# Patient Record
Sex: FEMALE | Race: BLACK OR AFRICAN AMERICAN | Employment: FULL TIME | URBAN - METROPOLITAN AREA
[De-identification: names, ages, dates, MRNs, and addresses within clinical notes are randomized per-mention and may not be internally consistent; named-entity substitution may affect disease eponyms.]

---

## 2017-12-03 ENCOUNTER — APPOINTMENT (OUTPATIENT)
Dept: CT IMAGING | Age: 22
End: 2017-12-03
Attending: EMERGENCY MEDICINE
Payer: COMMERCIAL

## 2017-12-03 ENCOUNTER — HOSPITAL ENCOUNTER (EMERGENCY)
Age: 22
Discharge: HOME | End: 2017-12-03
Attending: EMERGENCY MEDICINE | Admitting: EMERGENCY MEDICINE
Payer: COMMERCIAL

## 2017-12-03 VITALS
OXYGEN SATURATION: 100 % | DIASTOLIC BLOOD PRESSURE: 62 MMHG | WEIGHT: 232 LBS | HEIGHT: 66 IN | HEART RATE: 79 BPM | TEMPERATURE: 98.4 F | SYSTOLIC BLOOD PRESSURE: 106 MMHG | RESPIRATION RATE: 16 BRPM | BODY MASS INDEX: 37.28 KG/M2

## 2017-12-03 DIAGNOSIS — K76.9 LIVER LESION: ICD-10-CM

## 2017-12-03 DIAGNOSIS — R10.84 ABDOMINAL PAIN, GENERALIZED: Primary | ICD-10-CM

## 2017-12-03 DIAGNOSIS — R19.7 DIARRHEA, UNSPECIFIED TYPE: ICD-10-CM

## 2017-12-03 LAB
ANION GAP SERPL CALC-SCNC: 8 MMOL/L (ref 5–15)
APPEARANCE UR: CLEAR
BACTERIA URNS QL MICRO: ABNORMAL /HPF
BASOPHILS # BLD: 0.1 K/UL (ref 0–0.1)
BASOPHILS NFR BLD: 1 % (ref 0–1)
BILIRUB UR QL CFM: NEGATIVE
BUN SERPL-MCNC: 7 MG/DL (ref 6–20)
BUN/CREAT SERPL: 11 (ref 12–20)
CALCIUM SERPL-MCNC: 8.7 MG/DL (ref 8.5–10.1)
CHLORIDE SERPL-SCNC: 108 MMOL/L (ref 97–108)
CO2 SERPL-SCNC: 24 MMOL/L (ref 21–32)
COLOR UR: ABNORMAL
CREAT SERPL-MCNC: 0.61 MG/DL (ref 0.55–1.02)
EOSINOPHIL # BLD: 0.2 K/UL (ref 0–0.4)
EOSINOPHIL NFR BLD: 2 % (ref 0–7)
EPITH CASTS URNS QL MICRO: ABNORMAL /LPF
ERYTHROCYTE [DISTWIDTH] IN BLOOD BY AUTOMATED COUNT: 16 % (ref 11.5–14.5)
GLUCOSE SERPL-MCNC: 79 MG/DL (ref 65–100)
GLUCOSE UR STRIP.AUTO-MCNC: NEGATIVE MG/DL
HCG UR QL: NEGATIVE
HCT VFR BLD AUTO: 34.5 % (ref 35–47)
HGB BLD-MCNC: 10.7 G/DL (ref 11.5–16)
HGB UR QL STRIP: NEGATIVE
KETONES UR QL STRIP.AUTO: ABNORMAL MG/DL
LEUKOCYTE ESTERASE UR QL STRIP.AUTO: NEGATIVE
LIPASE SERPL-CCNC: 135 U/L (ref 73–393)
LYMPHOCYTES # BLD: 3 K/UL (ref 0.8–3.5)
LYMPHOCYTES NFR BLD: 44 % (ref 12–49)
MCH RBC QN AUTO: 23.9 PG (ref 26–34)
MCHC RBC AUTO-ENTMCNC: 31 G/DL (ref 30–36.5)
MCV RBC AUTO: 77 FL (ref 80–99)
MONOCYTES # BLD: 0.4 K/UL (ref 0–1)
MONOCYTES NFR BLD: 6 % (ref 5–13)
MUCOUS THREADS URNS QL MICRO: ABNORMAL /LPF
NEUTS SEG # BLD: 3.2 K/UL (ref 1.8–8)
NEUTS SEG NFR BLD: 47 % (ref 32–75)
NITRITE UR QL STRIP.AUTO: NEGATIVE
PH UR STRIP: 6 [PH] (ref 5–8)
PLATELET # BLD AUTO: 386 K/UL (ref 150–400)
POTASSIUM SERPL-SCNC: 3.5 MMOL/L (ref 3.5–5.1)
PROT UR STRIP-MCNC: NEGATIVE MG/DL
RBC # BLD AUTO: 4.48 M/UL (ref 3.8–5.2)
RBC #/AREA URNS HPF: ABNORMAL /HPF (ref 0–5)
SODIUM SERPL-SCNC: 140 MMOL/L (ref 136–145)
SP GR UR REFRACTOMETRY: 1.03 (ref 1–1.03)
UR CULT HOLD, URHOLD: NORMAL
UROBILINOGEN UR QL STRIP.AUTO: 1 EU/DL (ref 0.2–1)
WBC # BLD AUTO: 6.8 K/UL (ref 3.6–11)
WBC URNS QL MICRO: ABNORMAL /HPF (ref 0–4)

## 2017-12-03 PROCEDURE — 96361 HYDRATE IV INFUSION ADD-ON: CPT

## 2017-12-03 PROCEDURE — 74177 CT ABD & PELVIS W/CONTRAST: CPT

## 2017-12-03 PROCEDURE — 81001 URINALYSIS AUTO W/SCOPE: CPT | Performed by: EMERGENCY MEDICINE

## 2017-12-03 PROCEDURE — 80048 BASIC METABOLIC PNL TOTAL CA: CPT | Performed by: STUDENT IN AN ORGANIZED HEALTH CARE EDUCATION/TRAINING PROGRAM

## 2017-12-03 PROCEDURE — 74011636320 HC RX REV CODE- 636/320: Performed by: EMERGENCY MEDICINE

## 2017-12-03 PROCEDURE — 74011000258 HC RX REV CODE- 258: Performed by: EMERGENCY MEDICINE

## 2017-12-03 PROCEDURE — 74011250636 HC RX REV CODE- 250/636: Performed by: EMERGENCY MEDICINE

## 2017-12-03 PROCEDURE — 36415 COLL VENOUS BLD VENIPUNCTURE: CPT | Performed by: STUDENT IN AN ORGANIZED HEALTH CARE EDUCATION/TRAINING PROGRAM

## 2017-12-03 PROCEDURE — 81025 URINE PREGNANCY TEST: CPT

## 2017-12-03 PROCEDURE — 96374 THER/PROPH/DIAG INJ IV PUSH: CPT

## 2017-12-03 PROCEDURE — 96375 TX/PRO/DX INJ NEW DRUG ADDON: CPT

## 2017-12-03 PROCEDURE — 85025 COMPLETE CBC W/AUTO DIFF WBC: CPT | Performed by: STUDENT IN AN ORGANIZED HEALTH CARE EDUCATION/TRAINING PROGRAM

## 2017-12-03 PROCEDURE — 83690 ASSAY OF LIPASE: CPT | Performed by: EMERGENCY MEDICINE

## 2017-12-03 PROCEDURE — 99284 EMERGENCY DEPT VISIT MOD MDM: CPT

## 2017-12-03 RX ORDER — LOPERAMIDE HCL 2 MG
2 TABLET ORAL
Qty: 20 TAB | Refills: 0 | Status: SHIPPED | OUTPATIENT
Start: 2017-12-03 | End: 2018-10-27

## 2017-12-03 RX ORDER — SODIUM CHLORIDE 0.9 % (FLUSH) 0.9 %
10 SYRINGE (ML) INJECTION
Status: COMPLETED | OUTPATIENT
Start: 2017-12-03 | End: 2017-12-03

## 2017-12-03 RX ORDER — ONDANSETRON 2 MG/ML
4 INJECTION INTRAMUSCULAR; INTRAVENOUS
Status: COMPLETED | OUTPATIENT
Start: 2017-12-03 | End: 2017-12-03

## 2017-12-03 RX ORDER — ONDANSETRON 4 MG/1
4 TABLET, ORALLY DISINTEGRATING ORAL
Qty: 15 TAB | Refills: 0 | Status: SHIPPED | OUTPATIENT
Start: 2017-12-03 | End: 2021-09-02 | Stop reason: ALTCHOICE

## 2017-12-03 RX ORDER — KETOROLAC TROMETHAMINE 30 MG/ML
30 INJECTION, SOLUTION INTRAMUSCULAR; INTRAVENOUS
Status: COMPLETED | OUTPATIENT
Start: 2017-12-03 | End: 2017-12-03

## 2017-12-03 RX ADMIN — ONDANSETRON 4 MG: 2 INJECTION INTRAMUSCULAR; INTRAVENOUS at 19:20

## 2017-12-03 RX ADMIN — SODIUM CHLORIDE 100 ML: 900 INJECTION, SOLUTION INTRAVENOUS at 19:46

## 2017-12-03 RX ADMIN — IOPAMIDOL 100 ML: 755 INJECTION, SOLUTION INTRAVENOUS at 19:46

## 2017-12-03 RX ADMIN — Medication 10 ML: at 19:46

## 2017-12-03 RX ADMIN — SODIUM CHLORIDE 1000 ML: 900 INJECTION, SOLUTION INTRAVENOUS at 19:20

## 2017-12-03 RX ADMIN — KETOROLAC TROMETHAMINE 30 MG: 30 INJECTION, SOLUTION INTRAMUSCULAR at 19:21

## 2017-12-03 NOTE — ED TRIAGE NOTES
Pt states that she has been having abd pain since Wednesday after she eats anything to her lower abd and Thursday started with diarrhea.

## 2017-12-03 NOTE — ED PROVIDER NOTES
HPI Comments: 25 y.o. female with no significant past medical history who presents ambulatory from home accompanied by her  with chief complaint of diarrhea. Pt reports 4-day history of mid-abdominal pain. Pt states diarrhea onset the following day with nausea. Pt reports 5 episodes of diarrhea per day. Pt elicits prior history of gastroenteritis, but notes today's symptoms are not c/w that. Pt suspects she is dehydrated. Pt denies previous abdominal surgeries or recent travel. Pt is compliant with prescribed PO birth control. Pertinent negatives include vomiting, dysuria and difficulty urinating. There are no other acute medical concerns at this time. Social hx: denies tobacco use; social EtOH use; denies illicit drug use  PCP: None    Note written by Andreina Santos, as dictated by Ankush Patel MD 7:01 PM        The history is provided by the patient. No  was used. History reviewed. No pertinent past medical history. History reviewed. No pertinent surgical history. History reviewed. No pertinent family history. Social History     Social History    Marital status: SINGLE     Spouse name: N/A    Number of children: N/A    Years of education: N/A     Occupational History    Not on file. Social History Main Topics    Smoking status: Never Smoker    Smokeless tobacco: Never Used    Alcohol use Yes    Drug use: No    Sexual activity: Not on file     Other Topics Concern    Not on file     Social History Narrative    No narrative on file         ALLERGIES: Review of patient's allergies indicates no known allergies. Review of Systems   Constitutional: Negative for fever. HENT: Negative for facial swelling and nosebleeds. Eyes: Negative for pain. Respiratory: Negative for cough, chest tightness and shortness of breath. Cardiovascular: Negative for chest pain and leg swelling.    Gastrointestinal: Positive for abdominal pain, diarrhea and nausea. Negative for vomiting. Endocrine: Negative for polyuria. Genitourinary: Negative for difficulty urinating and flank pain. Musculoskeletal: Negative for arthralgias and back pain. Skin: Negative for color change. Allergic/Immunologic: Negative for immunocompromised state. Neurological: Negative for dizziness and headaches. Hematological: Does not bruise/bleed easily. Psychiatric/Behavioral: Negative for agitation. All other systems reviewed and are negative. Vitals:    12/03/17 1823   BP: (!) 149/99   Pulse: 86   Resp: 16   Temp: 98.2 °F (36.8 °C)   SpO2: 100%   Weight: 105.2 kg (232 lb)   Height: 5' 6\" (1.676 m)            Physical Exam   Constitutional: She is oriented to person, place, and time. She appears well-developed and well-nourished. HENT:   Head: Normocephalic and atraumatic. Right Ear: External ear normal.   Left Ear: External ear normal.   Nose: Nose normal.   Mouth/Throat: Oropharynx is clear and moist.   Eyes: EOM are normal. Pupils are equal, round, and reactive to light. No scleral icterus. Neck: Normal range of motion. Neck supple. No JVD present. No tracheal deviation present. No thyromegaly present. Cardiovascular: Normal rate, regular rhythm, normal heart sounds and intact distal pulses. Exam reveals no friction rub. No murmur heard. Pulmonary/Chest: Effort normal and breath sounds normal. No stridor. No respiratory distress. She has no wheezes. She has no rales. She exhibits no tenderness. Abdominal: Soft. Bowel sounds are normal. She exhibits no distension. There is tenderness in the periumbilical area. There is no rebound and no guarding. Musculoskeletal: Normal range of motion. She exhibits no edema or tenderness. Lymphadenopathy:     She has no cervical adenopathy. Neurological: She is alert and oriented to person, place, and time. She has normal reflexes. No cranial nerve deficit. Coordination normal.   Skin: Skin is warm and dry.  No rash noted. No erythema. Psychiatric: She has a normal mood and affect. Her behavior is normal. Judgment and thought content normal.   Nursing note and vitals reviewed. Note written by Andreina Jonas, as dictated by Mandi Jean MD 7:01 PM     MDM  Number of Diagnoses or Management Options  Diagnosis management comments: 81-year-old  female presents to the emergency department with periumbilical abd tenderness and diarrhea. Will check basic blood work. We'll give IV fluids and Toradol. Differential diagnosis includes gastroenteritis, pancreatitis, hepatitis, colitis, appendicitis. We'll reassess when testing is completed. Patient agrees with this plan. Amount and/or Complexity of Data Reviewed  Clinical lab tests: ordered and reviewed  Tests in the radiology section of CPT®: ordered and reviewed  Tests in the medicine section of CPT®: ordered and reviewed  Decide to obtain previous medical records or to obtain history from someone other than the patient: yes  Obtain history from someone other than the patient: yes  Review and summarize past medical records: yes  Independent visualization of images, tracings, or specimens: yes    Risk of Complications, Morbidity, and/or Mortality  Presenting problems: high  Diagnostic procedures: high  Management options: high      ED Course       Procedures    7:30 PM  UA not resulted yet. Nursing aware and actively working to determine cause. 8:33 PM  Labs WNL. CT abdomen significant for vascular lesion in liver for which radiology recommends outpatient f/u. No acute abnormality. 8:33 PM  Discussed available lab and imaging results with patient. She verbalizes understanding and agrees with care plan. Will discharge patient home with specific return precautions; prescription Imodium and Zofran; f/u with PCP. Good return precautions given to patient. Close follow up with PCP recommended.  Patient and/or family voices understanding of this plan. Discharge instructions were explained by me and all concerns were addressed.

## 2017-12-04 NOTE — ED NOTES
Patient returned from CT, denies any pain, NADN, patient states she is feeling better, family at bedside, patient and family denies any needs at this time

## 2017-12-04 NOTE — DISCHARGE INSTRUCTIONS
Abdominal Pain: Care Instructions  Your Care Instructions    Abdominal pain has many possible causes. Some aren't serious and get better on their own in a few days. Others need more testing and treatment. If your pain continues or gets worse, you need to be rechecked and may need more tests to find out what is wrong. You may need surgery to correct the problem. Don't ignore new symptoms, such as fever, nausea and vomiting, urination problems, pain that gets worse, and dizziness. These may be signs of a more serious problem. Your doctor may have recommended a follow-up visit in the next 8 to 12 hours. If you are not getting better, you may need more tests or treatment. The doctor has checked you carefully, but problems can develop later. If you notice any problems or new symptoms, get medical treatment right away. Follow-up care is a key part of your treatment and safety. Be sure to make and go to all appointments, and call your doctor if you are having problems. It's also a good idea to know your test results and keep a list of the medicines you take. How can you care for yourself at home? · Rest until you feel better. · To prevent dehydration, drink plenty of fluids, enough so that your urine is light yellow or clear like water. Choose water and other caffeine-free clear liquids until you feel better. If you have kidney, heart, or liver disease and have to limit fluids, talk with your doctor before you increase the amount of fluids you drink. · If your stomach is upset, eat mild foods, such as rice, dry toast or crackers, bananas, and applesauce. Try eating several small meals instead of two or three large ones. · Wait until 48 hours after all symptoms have gone away before you have spicy foods, alcohol, and drinks that contain caffeine. · Do not eat foods that are high in fat. · Avoid anti-inflammatory medicines such as aspirin, ibuprofen (Advil, Motrin), and naproxen (Aleve).  These can cause stomach upset. Talk to your doctor if you take daily aspirin for another health problem. When should you call for help? Call 911 anytime you think you may need emergency care. For example, call if:  ? · You passed out (lost consciousness). ? · You pass maroon or very bloody stools. ? · You vomit blood or what looks like coffee grounds. ? · You have new, severe belly pain. ?Call your doctor now or seek immediate medical care if:  ? · Your pain gets worse, especially if it becomes focused in one area of your belly. ? · You have a new or higher fever. ? · Your stools are black and look like tar, or they have streaks of blood. ? · You have unexpected vaginal bleeding. ? · You have symptoms of a urinary tract infection. These may include:  ¨ Pain when you urinate. ¨ Urinating more often than usual.  ¨ Blood in your urine. ? · You are dizzy or lightheaded, or you feel like you may faint. ? Watch closely for changes in your health, and be sure to contact your doctor if:  ? · You are not getting better after 1 day (24 hours). Where can you learn more? Go to http://zeke-ness.info/. Enter Q373 in the search box to learn more about \"Abdominal Pain: Care Instructions. \"  Current as of: March 20, 2017  Content Version: 11.4  © 8709-5214 [employer]. Care instructions adapted under license by [employer] (which disclaims liability or warranty for this information). If you have questions about a medical condition or this instruction, always ask your healthcare professional. [place] [address] any warranty or liability for your use of this information. Diarrhea: Care Instructions  Your Care Instructions    Diarrhea is loose, watery stools (bowel movements). The exact cause is often hard to find. Sometimes diarrhea is your body's way of getting rid of what caused an upset stomach.  Viruses, food poisoning, and many medicines can cause diarrhea. Some people get diarrhea in response to emotional stress, anxiety, or certain foods. Almost everyone has diarrhea now and then. It usually isn't serious, and your stools will return to normal soon. The important thing to do is replace the fluids you have lost, so you can prevent dehydration. The doctor has checked you carefully, but problems can develop later. If you notice any problems or new symptoms, get medical treatment right away. Follow-up care is a key part of your treatment and safety. Be sure to make and go to all appointments, and call your doctor if you are having problems. It's also a good idea to know your test results and keep a list of the medicines you take. How can you care for yourself at home? · Watch for signs of dehydration, which means your body has lost too much water. Dehydration is a serious condition and should be treated right away. Signs of dehydration are:  ¨ Increasing thirst and dry eyes and mouth. ¨ Feeling faint or lightheaded. ¨ Darker urine, and a smaller amount of urine than normal.  · To prevent dehydration, drink plenty of fluids, enough so that your urine is light yellow or clear like water. Choose water and other caffeine-free clear liquids until you feel better. If you have kidney, heart, or liver disease and have to limit fluids, talk with your doctor before you increase the amount of fluids you drink. · Begin eating small amounts of mild foods the next day, if you feel like it. ¨ Try yogurt that has live cultures of Lactobacillus. (Check the label.)  ¨ Avoid spicy foods, fruits, alcohol, and caffeine until 48 hours after all symptoms are gone. ¨ Avoid chewing gum that contains sorbitol. ¨ Avoid dairy products (except for yogurt with Lactobacillus) while you have diarrhea and for 3 days after symptoms are gone. · The doctor may recommend that you take over-the-counter medicine, such as loperamide (Imodium), if you still have diarrhea after 6 hours. Read and follow all instructions on the label. Do not use this medicine if you have bloody diarrhea, a high fever, or other signs of serious illness. Call your doctor if you think you are having a problem with your medicine. When should you call for help? Call 911 anytime you think you may need emergency care. For example, call if:  ? · You passed out (lost consciousness). ? · Your stools are maroon or very bloody. ?Call your doctor now or seek immediate medical care if:  ? · You are dizzy or lightheaded, or you feel like you may faint. ? · Your stools are black and look like tar, or they have streaks of blood. ? · You have new or worse belly pain. ? · You have symptoms of dehydration, such as:  ¨ Dry eyes and a dry mouth. ¨ Passing only a little dark urine. ¨ Feeling thirstier than usual.   ? · You have a new or higher fever. ? Watch closely for changes in your health, and be sure to contact your doctor if:  ? · Your diarrhea is getting worse. ? · You see pus in the diarrhea. ? · You are not getting better after 2 days (48 hours). Where can you learn more? Go to http://zeke-ness.info/. Enter R845 in the search box to learn more about \"Diarrhea: Care Instructions. \"  Current as of: March 20, 2017  Content Version: 11.4  © 7108-1079 [employer]. Care instructions adapted under license by [employer] (which disclaims liability or warranty for this information). If you have questions about a medical condition or this instruction, always ask your healthcare professional. [place] [address] any warranty or liability for your use of this information.

## 2017-12-04 NOTE — ED NOTES
Verbal shift change report given to Laith Sutton RN  (oncoming nurse) by Taylor Joseph RN (offgoing nurse). Report included the following information SBAR, ED Summary, MAR and Recent Results.

## 2018-03-28 LAB
ANTIBODY SCREEN, EXTERNAL: NEGATIVE
CHLAMYDIA, EXTERNAL: NEGATIVE
HBSAG, EXTERNAL: NEGATIVE
HCT, EXTERNAL: 35.8
HGB, EXTERNAL: 11.4
HIV, EXTERNAL: NEGATIVE
N. GONORRHEA, EXTERNAL: NEGATIVE
RPR, EXTERNAL: NON REACTIVE
RUBELLA, EXTERNAL: NORMAL
TYPE, ABO & RH, EXTERNAL: NORMAL

## 2018-10-10 LAB — GRBS, EXTERNAL: NORMAL

## 2018-10-24 ENCOUNTER — HOSPITAL ENCOUNTER (INPATIENT)
Age: 23
LOS: 3 days | Discharge: HOME | End: 2018-10-27
Attending: STUDENT IN AN ORGANIZED HEALTH CARE EDUCATION/TRAINING PROGRAM | Admitting: OBSTETRICS & GYNECOLOGY
Payer: COMMERCIAL

## 2018-10-24 DIAGNOSIS — O13.9 GESTATIONAL HYPERTENSION, ANTEPARTUM: Primary | ICD-10-CM

## 2018-10-24 LAB
ALBUMIN SERPL-MCNC: 2.7 G/DL (ref 3.5–5)
ALBUMIN/GLOB SERPL: 0.6 {RATIO} (ref 1.1–2.2)
ALP SERPL-CCNC: 199 U/L (ref 45–117)
ALT SERPL-CCNC: 14 U/L (ref 12–78)
ANION GAP SERPL CALC-SCNC: 12 MMOL/L (ref 5–15)
AST SERPL-CCNC: 14 U/L (ref 15–37)
BASOPHILS # BLD: 0.1 K/UL (ref 0–0.1)
BASOPHILS NFR BLD: 1 % (ref 0–1)
BILIRUB SERPL-MCNC: 0.2 MG/DL (ref 0.2–1)
BUN SERPL-MCNC: 6 MG/DL (ref 6–20)
BUN/CREAT SERPL: 9 (ref 12–20)
CALCIUM SERPL-MCNC: 8.8 MG/DL (ref 8.5–10.1)
CHLORIDE SERPL-SCNC: 105 MMOL/L (ref 97–108)
CO2 SERPL-SCNC: 22 MMOL/L (ref 21–32)
CREAT SERPL-MCNC: 0.66 MG/DL (ref 0.55–1.02)
DIFFERENTIAL METHOD BLD: ABNORMAL
EOSINOPHIL # BLD: 0.1 K/UL (ref 0–0.4)
EOSINOPHIL NFR BLD: 1 % (ref 0–7)
ERYTHROCYTE [DISTWIDTH] IN BLOOD BY AUTOMATED COUNT: 17.9 % (ref 11.5–14.5)
GLOBULIN SER CALC-MCNC: 4.2 G/DL (ref 2–4)
GLUCOSE SERPL-MCNC: 81 MG/DL (ref 65–100)
HCT VFR BLD AUTO: 31 % (ref 35–47)
HGB BLD-MCNC: 9.7 G/DL (ref 11.5–16)
IMM GRANULOCYTES # BLD: 0.1 K/UL (ref 0–0.04)
IMM GRANULOCYTES NFR BLD AUTO: 1 % (ref 0–0.5)
LYMPHOCYTES # BLD: 2.3 K/UL (ref 0.8–3.5)
LYMPHOCYTES NFR BLD: 17 % (ref 12–49)
MCH RBC QN AUTO: 24.3 PG (ref 26–34)
MCHC RBC AUTO-ENTMCNC: 31.3 G/DL (ref 30–36.5)
MCV RBC AUTO: 77.5 FL (ref 80–99)
MONOCYTES # BLD: 1.1 K/UL (ref 0–1)
MONOCYTES NFR BLD: 8 % (ref 5–13)
NEUTS SEG # BLD: 9.5 K/UL (ref 1.8–8)
NEUTS SEG NFR BLD: 73 % (ref 32–75)
NRBC # BLD: 0 K/UL (ref 0–0.01)
NRBC BLD-RTO: 0 PER 100 WBC
PLATELET # BLD AUTO: 373 K/UL (ref 150–400)
PMV BLD AUTO: 10.4 FL (ref 8.9–12.9)
POTASSIUM SERPL-SCNC: 4.3 MMOL/L (ref 3.5–5.1)
PROT SERPL-MCNC: 6.9 G/DL (ref 6.4–8.2)
RBC # BLD AUTO: 4 M/UL (ref 3.8–5.2)
SODIUM SERPL-SCNC: 139 MMOL/L (ref 136–145)
WBC # BLD AUTO: 13.1 K/UL (ref 3.6–11)

## 2018-10-24 PROCEDURE — 75410000003 HC RECOV DEL/VAG/CSECN EA 0.5 HR: Performed by: STUDENT IN AN ORGANIZED HEALTH CARE EDUCATION/TRAINING PROGRAM

## 2018-10-24 PROCEDURE — 59200 INSERT CERVICAL DILATOR: CPT | Performed by: STUDENT IN AN ORGANIZED HEALTH CARE EDUCATION/TRAINING PROGRAM

## 2018-10-24 PROCEDURE — 74011250636 HC RX REV CODE- 250/636: Performed by: STUDENT IN AN ORGANIZED HEALTH CARE EDUCATION/TRAINING PROGRAM

## 2018-10-24 PROCEDURE — 65270000029 HC RM PRIVATE

## 2018-10-24 PROCEDURE — 80053 COMPREHEN METABOLIC PANEL: CPT | Performed by: STUDENT IN AN ORGANIZED HEALTH CARE EDUCATION/TRAINING PROGRAM

## 2018-10-24 PROCEDURE — 76060000078 HC EPIDURAL ANESTHESIA: Performed by: NURSE ANESTHETIST, CERTIFIED REGISTERED

## 2018-10-24 PROCEDURE — 77010026065 HC OXYGEN MINIMUM MEDICAL AIR: Performed by: STUDENT IN AN ORGANIZED HEALTH CARE EDUCATION/TRAINING PROGRAM

## 2018-10-24 PROCEDURE — 75410000002 HC LABOR FEE PER 1 HR: Performed by: STUDENT IN AN ORGANIZED HEALTH CARE EDUCATION/TRAINING PROGRAM

## 2018-10-24 PROCEDURE — 85025 COMPLETE CBC W/AUTO DIFF WBC: CPT | Performed by: STUDENT IN AN ORGANIZED HEALTH CARE EDUCATION/TRAINING PROGRAM

## 2018-10-24 PROCEDURE — 74011250637 HC RX REV CODE- 250/637: Performed by: OBSTETRICS & GYNECOLOGY

## 2018-10-24 PROCEDURE — 36415 COLL VENOUS BLD VENIPUNCTURE: CPT | Performed by: STUDENT IN AN ORGANIZED HEALTH CARE EDUCATION/TRAINING PROGRAM

## 2018-10-24 RX ORDER — LIDOCAINE HYDROCHLORIDE 10 MG/ML
10 INJECTION INFILTRATION; PERINEURAL ONCE
Status: ACTIVE | OUTPATIENT
Start: 2018-10-24 | End: 2018-10-25

## 2018-10-24 RX ORDER — SODIUM CHLORIDE, SODIUM LACTATE, POTASSIUM CHLORIDE, CALCIUM CHLORIDE 600; 310; 30; 20 MG/100ML; MG/100ML; MG/100ML; MG/100ML
125 INJECTION, SOLUTION INTRAVENOUS CONTINUOUS
Status: DISCONTINUED | OUTPATIENT
Start: 2018-10-24 | End: 2018-10-27

## 2018-10-24 RX ORDER — ONDANSETRON 2 MG/ML
4 INJECTION INTRAMUSCULAR; INTRAVENOUS
Status: DISCONTINUED | OUTPATIENT
Start: 2018-10-24 | End: 2018-10-25 | Stop reason: SDUPTHER

## 2018-10-24 RX ORDER — HYDROMORPHONE HYDROCHLORIDE 2 MG/ML
1 INJECTION, SOLUTION INTRAMUSCULAR; INTRAVENOUS; SUBCUTANEOUS
Status: DISCONTINUED | OUTPATIENT
Start: 2018-10-24 | End: 2018-10-25 | Stop reason: HOSPADM

## 2018-10-24 RX ORDER — NALOXONE HYDROCHLORIDE 0.4 MG/ML
0.4 INJECTION, SOLUTION INTRAMUSCULAR; INTRAVENOUS; SUBCUTANEOUS AS NEEDED
Status: DISCONTINUED | OUTPATIENT
Start: 2018-10-24 | End: 2018-10-25 | Stop reason: SDUPTHER

## 2018-10-24 RX ORDER — ZOLPIDEM TARTRATE 5 MG/1
5 TABLET ORAL
Status: DISCONTINUED | OUTPATIENT
Start: 2018-10-24 | End: 2018-10-27 | Stop reason: HOSPADM

## 2018-10-24 RX ORDER — SODIUM CHLORIDE, SODIUM LACTATE, POTASSIUM CHLORIDE, CALCIUM CHLORIDE 600; 310; 30; 20 MG/100ML; MG/100ML; MG/100ML; MG/100ML
500 INJECTION, SOLUTION INTRAVENOUS ONCE
Status: COMPLETED | OUTPATIENT
Start: 2018-10-24 | End: 2018-10-24

## 2018-10-24 RX ADMIN — SODIUM CHLORIDE, SODIUM LACTATE, POTASSIUM CHLORIDE, AND CALCIUM CHLORIDE 500 ML/HR: 600; 310; 30; 20 INJECTION, SOLUTION INTRAVENOUS at 16:41

## 2018-10-24 RX ADMIN — DINOPROSTONE 10 MG: 10 INSERT VAGINAL at 17:38

## 2018-10-24 NOTE — PROGRESS NOTES
1620 received a g1 at 38.4 weeks with dx gestational HTN. aaaox3 skinwarm and dry. resp even and unlored, denies s/s of pre e, denies ctxs, rom or vaginal bleeding . + fm per Pt Oriented to room. Call bell in reach [phone] Dr Justin Hayes aware fhr accelss to 180 and very active 0 Dr Justin Hayes at bedside and reviewed EFm strip, explaining cervidil and pt verbalizwa understanding 1850 Bedside and Verbal shift change report given to a Keri RM (oncoming nurse) by Tobias Celaya RNC (offgoing nurse). Report included the following information SBAR, Kardex, Intake/Output, MAR and Recent Results.

## 2018-10-24 NOTE — H&P
History & Physical 
 
Name: Atif Ross MRN: [id number]  SSN: [id number] YOB: 1995  Age: 26 y.o. Sex: female Subjective:  
 
Estimated Date of Delivery: 11/3/18 OB History  Para Term  AB Living 1 SAB TAB Ectopic Molar Multiple Live Births # Outcome Date GA Lbr Moisés/2nd Weight Sex Delivery Anes PTL Lv  
1 Current Ms. Lilian Valadez is admitted with pregnancy at 38w4d for induction of labor due to [facility]. Prenatal course was complicated by obesity and GHTN. Ronna [place] Please see prenatal records for details. Denies ha/visual change/ruq pain. Notes good FM. Past Medical History:  
Diagnosis Date  Anemia  Gestational hypertension  Gestational hypertension 10/24/2018 History reviewed. No pertinent surgical history. Social History Occupational History  Not on file Tobacco Use  Smoking status: Never Smoker  Smokeless tobacco: Never Used Substance and Sexual Activity  Alcohol use: No  
  Frequency: Never  Drug use: No  
 Sexual activity: Yes  
  Partners: Male Birth control/protection: None Family History Problem Relation Age of Onset  Hypertension Mother  Heart Disease Maternal Grandmother  Diabetes Paternal Grandmother  Heart Disease Paternal Grandmother  Cancer Paternal Grandmother No Known Allergies Prior to Admission medications Medication Sig Start Date End Date Taking? Authorizing Provider  
loperamide (IMODIUM A-D) 2 mg tablet Take 1 Tab by mouth four (4) times daily as needed for Diarrhea. 12/3/17   Dori Sherwood MD  
ondansetron (ZOFRAN ODT) 4 mg disintegrating tablet Take 1 Tab by mouth every eight (8) hours as needed for Nausea. 12/3/17   Dori Sherwood MD  
  
 
Review of Systems: A comprehensive review of systems was negative except for that written in the History of Present Illness. Objective:  
 
Vitals: 
Vitals: 10/24/18 1642 10/24/18 1645 10/24/18 1650 10/24/18 1655 BP:   134/76 Pulse:   95 Resp:   18 Temp:   98.5 °F (36.9 °C) SpO2: 92% 98% 94% 97% Weight: 119.3 kg (263 lb) Height: 5' 6\" (1.676 m) Physical Exam: 
Patient without distress. Heart: Tachy rate and regular rhythm Lung: normal respiratory effort Abdomen: soft, nontender Fundus: soft and non tender Perineum: blood absent, amniotic fluid absent Cervical Exam: 250/-2 Lower Extremities: [facility] Membranes:  Intact Fetal Heart Rate: Reactive Cervidil placed in the posterior fornix. Prenatal Labs:  
Lab Results Component Value Date/Time  
 Rubella, External immune 2018 HBsAg, External negative 2018 HIV, External negative 2018 RPR, External non reactive 2018 Gonorrhea, External negative 2018 Chlamydia, External negative 2018 ABO,Rh O Positive 2018 GrBStrep, External 10/10/2018 10/10/2018 Impression/Plan: Active Problems: 
  Gestational hypertension (10/24/2018) Plan: Admit for induction of labor. Group B Strep negative. Cervidil tonight per Dr. Caio Sheldon per protocol in the AM. [facility] 
Consented IVF/Clears Pain management if desired Expect  Signed By:  Yaneth Little MD   
 2018

## 2018-10-25 ENCOUNTER — ANESTHESIA (OUTPATIENT)
Dept: LABOR AND DELIVERY | Age: 23
End: 2018-10-25
Payer: COMMERCIAL

## 2018-10-25 ENCOUNTER — ANESTHESIA EVENT (OUTPATIENT)
Dept: LABOR AND DELIVERY | Age: 23
End: 2018-10-25
Payer: COMMERCIAL

## 2018-10-25 PROCEDURE — 74011250636 HC RX REV CODE- 250/636: Performed by: ANESTHESIOLOGY

## 2018-10-25 PROCEDURE — 74011250636 HC RX REV CODE- 250/636: Performed by: STUDENT IN AN ORGANIZED HEALTH CARE EDUCATION/TRAINING PROGRAM

## 2018-10-25 PROCEDURE — 75410000002 HC LABOR FEE PER 1 HR: Performed by: STUDENT IN AN ORGANIZED HEALTH CARE EDUCATION/TRAINING PROGRAM

## 2018-10-25 PROCEDURE — 3E033VJ INTRODUCTION OF OTHER HORMONE INTO PERIPHERAL VEIN, PERCUTANEOUS APPROACH: ICD-10-PCS | Performed by: STUDENT IN AN ORGANIZED HEALTH CARE EDUCATION/TRAINING PROGRAM

## 2018-10-25 PROCEDURE — 77030018749 HC HK AMNIO DISP DERY -A

## 2018-10-25 PROCEDURE — 3E0R3BZ INTRODUCTION OF ANESTHETIC AGENT INTO SPINAL CANAL, PERCUTANEOUS APPROACH: ICD-10-PCS | Performed by: ANESTHESIOLOGY

## 2018-10-25 PROCEDURE — 65270000029 HC RM PRIVATE

## 2018-10-25 PROCEDURE — 10907ZC DRAINAGE OF AMNIOTIC FLUID, THERAPEUTIC FROM PRODUCTS OF CONCEPTION, VIA NATURAL OR ARTIFICIAL OPENING: ICD-10-PCS | Performed by: STUDENT IN AN ORGANIZED HEALTH CARE EDUCATION/TRAINING PROGRAM

## 2018-10-25 PROCEDURE — 0HQ9XZZ REPAIR PERINEUM SKIN, EXTERNAL APPROACH: ICD-10-PCS | Performed by: STUDENT IN AN ORGANIZED HEALTH CARE EDUCATION/TRAINING PROGRAM

## 2018-10-25 PROCEDURE — 00HU33Z INSERTION OF INFUSION DEVICE INTO SPINAL CANAL, PERCUTANEOUS APPROACH: ICD-10-PCS | Performed by: ANESTHESIOLOGY

## 2018-10-25 PROCEDURE — 74011250636 HC RX REV CODE- 250/636: Performed by: OBSTETRICS & GYNECOLOGY

## 2018-10-25 PROCEDURE — 74011250637 HC RX REV CODE- 250/637: Performed by: STUDENT IN AN ORGANIZED HEALTH CARE EDUCATION/TRAINING PROGRAM

## 2018-10-25 PROCEDURE — 77030014125 HC TY EPDRL BBMI -B: Performed by: ANESTHESIOLOGY

## 2018-10-25 PROCEDURE — 77030011943

## 2018-10-25 PROCEDURE — 77030005537 HC CATH URETH BARD -A

## 2018-10-25 PROCEDURE — 74011000250 HC RX REV CODE- 250

## 2018-10-25 RX ORDER — OXYTOCIN/RINGER'S LACTATE 20/1000 ML
125-500 PLASTIC BAG, INJECTION (ML) INTRAVENOUS ONCE
Status: ACTIVE | OUTPATIENT
Start: 2018-10-25 | End: 2018-10-26

## 2018-10-25 RX ORDER — ACETAMINOPHEN 325 MG/1
650 TABLET ORAL
Status: DISCONTINUED | OUTPATIENT
Start: 2018-10-25 | End: 2018-10-27 | Stop reason: HOSPADM

## 2018-10-25 RX ORDER — SODIUM CHLORIDE, SODIUM LACTATE, POTASSIUM CHLORIDE, CALCIUM CHLORIDE 600; 310; 30; 20 MG/100ML; MG/100ML; MG/100ML; MG/100ML
125 INJECTION, SOLUTION INTRAVENOUS CONTINUOUS
Status: DISCONTINUED | OUTPATIENT
Start: 2018-10-25 | End: 2018-10-27

## 2018-10-25 RX ORDER — IBUPROFEN 800 MG/1
800 TABLET ORAL EVERY 8 HOURS
Status: DISCONTINUED | OUTPATIENT
Start: 2018-10-25 | End: 2018-10-27 | Stop reason: HOSPADM

## 2018-10-25 RX ORDER — PEPPERMINT OIL
SPIRIT ORAL
Status: DISPENSED
Start: 2018-10-25 | End: 2018-10-26

## 2018-10-25 RX ORDER — BUPIVACAINE HYDROCHLORIDE 2.5 MG/ML
INJECTION, SOLUTION EPIDURAL; INFILTRATION; INTRACAUDAL AS NEEDED
Status: DISCONTINUED | OUTPATIENT
Start: 2018-10-25 | End: 2018-10-25 | Stop reason: HOSPADM

## 2018-10-25 RX ORDER — OXYTOCIN/0.9 % SODIUM CHLORIDE 30/500 ML
0-25 PLASTIC BAG, INJECTION (ML) INTRAVENOUS
Status: DISCONTINUED | OUTPATIENT
Start: 2018-10-25 | End: 2018-10-27

## 2018-10-25 RX ORDER — SODIUM CHLORIDE 0.9 % (FLUSH) 0.9 %
5-10 SYRINGE (ML) INJECTION AS NEEDED
Status: DISCONTINUED | OUTPATIENT
Start: 2018-10-25 | End: 2018-10-27

## 2018-10-25 RX ORDER — NALOXONE HYDROCHLORIDE 0.4 MG/ML
0.4 INJECTION, SOLUTION INTRAMUSCULAR; INTRAVENOUS; SUBCUTANEOUS AS NEEDED
Status: DISCONTINUED | OUTPATIENT
Start: 2018-10-25 | End: 2018-10-27 | Stop reason: HOSPADM

## 2018-10-25 RX ORDER — HYDROCORTISONE ACETATE PRAMOXINE HCL 2.5; 1 G/100G; G/100G
CREAM TOPICAL AS NEEDED
Status: DISCONTINUED | OUTPATIENT
Start: 2018-10-25 | End: 2018-10-27 | Stop reason: HOSPADM

## 2018-10-25 RX ORDER — DOCUSATE SODIUM 100 MG/1
100 CAPSULE, LIQUID FILLED ORAL 2 TIMES DAILY
Status: DISCONTINUED | OUTPATIENT
Start: 2018-10-25 | End: 2018-10-27 | Stop reason: HOSPADM

## 2018-10-25 RX ORDER — DIPHENHYDRAMINE HYDROCHLORIDE 50 MG/ML
12.5 INJECTION, SOLUTION INTRAMUSCULAR; INTRAVENOUS
Status: DISCONTINUED | OUTPATIENT
Start: 2018-10-25 | End: 2018-10-27 | Stop reason: HOSPADM

## 2018-10-25 RX ORDER — SODIUM CHLORIDE 0.9 % (FLUSH) 0.9 %
5-10 SYRINGE (ML) INJECTION EVERY 8 HOURS
Status: DISCONTINUED | OUTPATIENT
Start: 2018-10-25 | End: 2018-10-27

## 2018-10-25 RX ORDER — ONDANSETRON 2 MG/ML
4 INJECTION INTRAMUSCULAR; INTRAVENOUS
Status: DISCONTINUED | OUTPATIENT
Start: 2018-10-25 | End: 2018-10-27 | Stop reason: HOSPADM

## 2018-10-25 RX ORDER — OXYCODONE AND ACETAMINOPHEN 5; 325 MG/1; MG/1
1 TABLET ORAL
Status: DISCONTINUED | OUTPATIENT
Start: 2018-10-25 | End: 2018-10-27 | Stop reason: HOSPADM

## 2018-10-25 RX ORDER — LIDOCAINE HYDROCHLORIDE AND EPINEPHRINE 15; 5 MG/ML; UG/ML
INJECTION, SOLUTION EPIDURAL AS NEEDED
Status: DISCONTINUED | OUTPATIENT
Start: 2018-10-25 | End: 2018-10-25 | Stop reason: HOSPADM

## 2018-10-25 RX ORDER — FENTANYL/BUPIVACAINE/NS/PF 2-1250MCG
1-16 PREFILLED PUMP RESERVOIR EPIDURAL CONTINUOUS
Status: DISCONTINUED | OUTPATIENT
Start: 2018-10-25 | End: 2018-10-27

## 2018-10-25 RX ORDER — EPHEDRINE SULFATE 50 MG/ML
10 INJECTION, SOLUTION INTRAVENOUS
Status: DISCONTINUED | OUTPATIENT
Start: 2018-10-25 | End: 2018-10-25 | Stop reason: HOSPADM

## 2018-10-25 RX ADMIN — Medication 10 ML/HR: at 14:05

## 2018-10-25 RX ADMIN — BUPIVACAINE HYDROCHLORIDE 3 ML: 2.5 INJECTION, SOLUTION EPIDURAL; INFILTRATION; INTRACAUDAL at 13:38

## 2018-10-25 RX ADMIN — BUPIVACAINE HYDROCHLORIDE 5 ML: 2.5 INJECTION, SOLUTION EPIDURAL; INFILTRATION; INTRACAUDAL at 13:33

## 2018-10-25 RX ADMIN — IBUPROFEN 800 MG: 800 TABLET ORAL at 23:36

## 2018-10-25 RX ADMIN — SODIUM CHLORIDE, SODIUM LACTATE, POTASSIUM CHLORIDE, AND CALCIUM CHLORIDE 999 ML/HR: 600; 310; 30; 20 INJECTION, SOLUTION INTRAVENOUS at 16:45

## 2018-10-25 RX ADMIN — SODIUM CHLORIDE, SODIUM LACTATE, POTASSIUM CHLORIDE, AND CALCIUM CHLORIDE 999 ML/HR: 600; 310; 30; 20 INJECTION, SOLUTION INTRAVENOUS at 12:29

## 2018-10-25 RX ADMIN — HYDROMORPHONE HYDROCHLORIDE 1 MG: 2 INJECTION INTRAMUSCULAR; INTRAVENOUS; SUBCUTANEOUS at 00:24

## 2018-10-25 RX ADMIN — OXYTOCIN-SODIUM CHLORIDE 0.9% IV SOLN 30 UNIT/500ML 2 MILLI-UNITS/MIN: 30-0.9/5 SOLUTION at 10:45

## 2018-10-25 RX ADMIN — SODIUM CHLORIDE, SODIUM LACTATE, POTASSIUM CHLORIDE, AND CALCIUM CHLORIDE 125 ML/HR: 600; 310; 30; 20 INJECTION, SOLUTION INTRAVENOUS at 13:47

## 2018-10-25 RX ADMIN — LIDOCAINE HYDROCHLORIDE AND EPINEPHRINE 3 ML: 15; 5 INJECTION, SOLUTION EPIDURAL at 13:31

## 2018-10-25 NOTE — PROGRESS NOTES
5024 - Bedside, Verbal and Written shift change report given to this RN, Lucia Oneil (oncoming nurse) by Jackie Viveros RN (offgoing nurse). Report included the following information SBAR, Kardex, Procedure Summary, Intake/Output, MAR, Recent Results and Med Rec Status. Per night shift RN, no orders in place for LR at 125 or Pitocin for induction. Will notify MD when able. IV saline locked; flushed with NS and alcohol caps changed at this time. 2812 - VS and assessment done at this time. Pt resting comfortably in bed, denies further needs. 5065 - Upon order review, Pitocin order and LR order now active - will start as able, upon review from pharmacy. 46 - Dr. Nilay Reveles at nurses station; updated regarding Pitocin about to be started per orders. No other new orders given. 1529 - Pt requesting to have SVE by MD prior to starting pitocin - SVE by Dr. Nilay Reveles at this time (4/50/-2). Pt requests to have AROM rather than pitocin at this time - MD agrees to poc.  
 
0749 - AROM by MD at this time - clear fluid. Pads changed. MD states pt may be monitored (FHT/Ctx) intermittently now. Moderate ctx. MD reviewed strip. Palpated. Pt states she is coping well with ctx.  
 
0753 - Pt off EFM x 2 at this time to ambulate in room/hallway, will monitor intermittently. 46 - RN at bedside. BP taken. Pt denies further needs. Clear liquids given. 6745 - Pt placed back on EFM at this time. Resting comfortably in bed, denies needs. 4863 - VS done at this time. Pt back off EFM, intermittently monitoring. Pt coping well with ctx; denies further needs at this time. 2003 - Pt sitting on birth ball. Moved back to bed at this time and placed back on EFM for monitoring. Pt continues to cope well with ctx. Denies further needs. Encouraged PO fluids since IV saline locked. Recommended different positions for coping with labor during ctx - pt verbalized understanding. 1005 - Dr. Krystian Scanlon at bedside to see pt, SVE (4/50/-1; no change). MD recommends pt be started on pitocin now - pt agrees to poc. Possible thin meconium fluid noted on dorian pad - MD present/aware; pads changed. 1012 - Attempting to place pt on telemetry monitor so she may ambulate in dias while monitored. Unsuccessful due to equipment issues. 1023 - Pt moved to labor room 212 at this time to attempt using telemetry monitor in different room. Continues to be unsuccessful. Pt states understanding and will sit on ball/reposition self while at bedside, on regular EFM. 1045 - LR restarted at 125/hr (restarted bag that was hung overnight) and Pitocin started at 2/hr at this time, per protocol/orders. 1047 - Pt. States she is still coping well with ctx; pt is tearful; pt denies further needs at this time; offered heat/position changes/epidural/IV pain medication; pt denied all suggestions. Family remains at bedside. 1108 to 1115 - Pt oob to bathroom independently; voided without difficulty. Pads changed. Thin meconium stained fluid noted on pad; continuing to monitor closely per MD recommendations. 1141 - BP taken. Rounded on pt; pt continues to be tearful and states ctx \"hurt really bad\" but declines any type of pain management. Hip squeezes attempted at this time; pt declined this intervention/states it did not help alleviate pain. 1142 - Pitocin increased to 4/hr at this time. 1220 - Pt requesting epidural. IVF increased to bolus rate and Dr. Jaxson Weems called to notify. Will call anesth. Back once bolus complete to get anesth. At bedside for epidural placement. 1225 - Pitocin turned off per patient request at this time. 1231 - Pt requesting to ambulate, off monitor, while off pitocin / prior to epidural placement. Off efm x 2 at this time - will reapply prior to epidural placement.  
 
36 - Dr. Krystian Scanlon at nurses station; reviewed strip; updated MD on pt status at this time. No new orders given. [phone] - Dr. Pinky Cortés at bedside at this time to see pt. No new orders given. MD will come back after pt comfortable with epidural. VS done at this time. Pt denies further needs at this time. PO fluids offered. 1310 - Pt placed back on EFM at this time, repositioned to sitting on side of bed, to prep for epidural placement. Dr. Nellie Piedra notified pt bolus almost complete and pt ready for epidural. 
 
1318 - PEYMAN Conner CRNA at bedside for epidural placement. Time out done at this time. 1320 - Pulse ox applied. RN remains at bedside. Pt continues to be sitting up on side of bed for epidural.  
 
1314 to 1342 - Difficulty tracing fetal HR while pt sitting up/leaning forward for epidural. Tracing maternal HR. Pulse ox remains in place. 1345 - Epidural complete. Pt positioned on right lateral side. Monitors adjusted. 1353 - Turned to left lateral. Monitors adjusted. 1357 - Pitocin turned back on at 2/hr at this time. Tracing reactive. No decels. 1405 - Epidural pump fluid started at this time per MD orders. Verified with second RN.  
 
1426 - VS and reassessment done at this time. Pt denies further needs, denies pain. PO fluids given. 1508 to 1510 - Straight cath done at this time. 75 mL clear yellow urine emptied from bladder. Pt then repositioned to right lateral side. Pads changed. Monitors adjusted. 1513 - VS done. Pt resting comfortably in bed, with family at bedside; pt denies pain and denies further needs. 1544 - Possible late decels noted and pt c/o increased rectal pressure. SVE done by RN (6/80/-2). Pt then repositioned to left lateral side. 1555 - LR bolus started and pt turned to further left lateral side. Monitors adjusted. 1614 - RN at bedside. Monitoring FHT closely. Pt resting comfortably; c/o increased rectal pressure. Ctx palpated at 2-3 min, difficulty tracing. [phone] - Dr. Charles Mueller called to update on possible late decels, interventions, difficulty tracing ctx, pt feeling increased pressure. MD states she will be at bedside in about 10 min. 1633 - O2 applied at 10 LPM via NRB for continued late decels.  [facility] [address] Dr. Charles Mueller at bedside; reviewed strip, SVE (pt complete). Nursery RN and Charge RN notified. Pt okay with pt to begin pushing. This RN gave instructions to pt for pushing; pt states understanding. 1658 - Began pushing with ctx at this time. RN remains at bedside. 1705 - Pt pushing well with ctx. RN remains at bedside. 1 - MD called for delivery; arrived same time. 18 -  of live female infant at this time. Gralla 48. Delivery of placenta. Pitocin increased to PP rate per protocol. 1728 - Straight cath done by MD at this time. Large amount clear, yellow urine emptied from bladder. 1734 - Epidural pump turned off and capped at this time. Total infused 43.3 mL.  
 
173 - Recovery period started at this time. 1855 - Epidural catheter removed at this time by this RN, without difficulty; tip intact. Pads changed. Gown changed.  - Dr. Charles Mueller at nurses station; updated on pt elevated temp and continued elevated HR; no new orders given.  - Bedside, Verbal and Written shift change report given to Ellis Molina RN (oncoming nurse) by this RN, Rubin Guzman (offgoing nurse). Report included the following information SBAR, Kardex, Procedure Summary, Intake/Output, MAR, Recent Results and Med Rec Status. Relinquished care of pt.

## 2018-10-25 NOTE — PROGRESS NOTES
Labor Progress Note Patient seen, fetal heart rate and contraction pattern evaluated at 0800. Feeling cramping and discomfort overnight after cervidil. Physical Exam: 
Vitals:  
Vitals:  
 10/25/18 0522 10/25/18 6339 10/25/18 2617 10/25/18 7513 BP:   136/64 130/64 Pulse:   (!) 108 95 Resp:    14 Temp:    98.7 °F (37.1 °C) SpO2: 98% 99% Weight:      
Height:      
   
 
Cervical Exam was examined   4 cm dilated 50% effaced   
-2 station Presenting Part: cephalic AROM clear fluid Uterine Activity[de-identified] Frequency: Every 2-4 minutes Fetal Heart Rate: Reactive Baseline: 145 per minute Variability: moderate Accelerations: yes Decelerations: none Pitocin: N/A Assessment/Plan: 
Ms. Kathi Rosen is admitted with pregnancy at 38w5d undergoing IOL for GHTN  
 
S/p cervidil for ripening overnight Discussed AROM vs Pit + AROM pt would like to avoid epidural and asks to try AROM first before starting pitocin BPs normotensive Fetal surveillance is reassuring, can intermittently monitor Recheck in 2-4hrs Shivani Burdick DO 
10/25/2018 
7:44 AM

## 2018-10-25 NOTE — L&D DELIVERY NOTE
Patient ID:  Katherine Jane  [id number]  39 y.o.  1995    Admit Date: 10/24/2018    Discharge Date: 10/27/2018     Admitting Physician: Abril Hernandez MD    Attending Physician: Conchita Durant DO    Admission Diagnoses: Pregnancy  Gestational hypertension, Induction of labor    Procedures for this admission: Induction of labor for GHTN at 38w 5d    Discharge Diagnoses: Same as above with TSVD producing a viable infant. Information for the patient's :  Duc England, Female [[id number]]      APGARs 9 and 9   1st degree perineal laceration      Discharge Disposition:  Home    Discharge Condition:  Stable     Additional Diagnoses: gestational hypertension . Maternal Labs:   Lab Results   Component Value Date/Time    HBsAg, External negative 2018    HIV, External negative 2018    Rubella, External immune 2018    RPR, External non reactive 2018    GrBStrep, External 10/10/2018 10/10/2018       Cord Blood Results:   Information for the patient's :  Duc England, Female [[id number]]   No results found for: PCTABR, ABORH, PCTDIG, BILI, ABORH, ABORHEXT          History of Present Illness:   OB History      Para Term  AB Living    1              SAB TAB Ectopic Molar Multiple Live Births                       Admitted for induction of labor. Hospital Course:   Patient was admitted as above and delivered via TSVD . Please the chart for details. The postpartum course was unremarkable. She was deemed stable for discharge home on day 2. Follow-up Care:  Follow up with Dr. Dayanna Lee in 6wks       Signed:  Conchita Durant DO  10/25/2018  5:42 PM

## 2018-10-25 NOTE — L&D DELIVERY NOTE
Delivery Note:     Patient reached FD and pushed with good effort to deliver the fetal head in OA position. No nuchal cord found. The anterior shoulder, followed by the posterior shoulder and the rest of the body then delivered easily. This was a female with Apgars of 9 and 9 at 1 and 5 minutes respectively, weight pending. The infant was bulb suctioned and placed on mom's abdomen. The cord was then double clamped and cut by the FOB. Cord blood was collected. The placenta followed spontaneously, intact, with 3VC. Pitocin was added to the IVF and the fundus was firm to palpation. The vagina, cervix and perineum were examined and a 1st degree laceration was found and repaired with a figure of eight suture of 2-0 vicryl.  mL. No complications. Mom and baby doing well.       Pepe Lewis [facility]

## 2018-10-25 NOTE — PROGRESS NOTES
Pt feeling mild-mod CTX Q3-6min, has been up ambulating. Cervix rechecked and unchanged at 4/50/-2 Pt agrees to start pitocin, titrate to adequate CTX pattern, recheck after 4hrs adequate pattern or PRN  
 
Shayla Kumar DO

## 2018-10-25 NOTE — PROGRESS NOTES
[address] care of pt at this time from [address] [place] [address], Moreno Cespedes RN. VON Mackay states she just informed Dr. Pinky Cortés of pt's elevated temp and bp, no new orders were received just continue to monitor. 2050 Bedside shift change report given to MARLON Nielsen RN (oncoming nurse) by CHRISTINE Davis RN (offgoing nurse). Report included the following information SBAR, Kardex, Intake/Output, MAR, Recent Results and Med Rec Status.

## 2018-10-25 NOTE — ANESTHESIA PREPROCEDURE EVALUATION
Anesthetic History No history of anesthetic complications Review of Systems / Medical History Patient summary reviewed and pertinent labs reviewed Pulmonary Within defined limits Neuro/Psych Within defined limits Cardiovascular Exercise tolerance: >4 METS 
  
GI/Hepatic/Renal 
Within defined limits Endo/Other Morbid obesity Other Findings Comments: Induction for gestational hypertension Physical Exam 
 
Airway Mallampati: II 
TM Distance: 4 - 6 cm Neck ROM: normal range of motion Mouth opening: Normal 
 
 Cardiovascular Rhythm: regular Rate: normal 
 
 
 
 Dental 
No notable dental hx Pulmonary Breath sounds clear to auscultation Abdominal 
GI exam deferred Other Findings Anesthetic Plan ASA: 2 Anesthesia type: epidural 
 
 
 
 
Induction: Inhalational 
Anesthetic plan and risks discussed with: Patient Late entry - preop done, questions answered, and consent obtained prior to procedure; note entered after procedure at 1:50 PM.

## 2018-10-25 NOTE — PROGRESS NOTES
Rounded on patient at 1300 Ambulating at the bedside CTX more intense and pt wants epidural  
Bolus infusing, pt asking to stop pitocin while waiting for epidural  
Plan to get epidural and restart pitocin --> titrate back to good pattern and recheck 2-4hrs after pattern or PRN Fetal surveillance has been reassuring Helena Cano DO

## 2018-10-25 NOTE — PROGRESS NOTES
1900 Assumed care of pt at this time from 1 [facility], Anthony Langley RN. 
 
0530 Cervidil removed at this time. 0700 Bedside shift change report given to VIKTORIA Mackay RN (oncoming nurse) by CHRISTINE Jones RN (offgoing nurse). Report included the following information SBAR, Kardex, MAR, Accordion, Recent Results and Med Rec Status.

## 2018-10-25 NOTE — ANESTHESIA PROCEDURE NOTES
Epidural Block Start time: 10/25/2018 1:17 PM 
End time: 10/25/2018 1:43 PM 
Performed by: Richa Castro CRNA Authorized by: Kraig Vidal MD  
 
Pre-Procedure Indication: labor epidural   
Preanesthetic Checklist: patient identified, risks and benefits discussed, anesthesia consent, timeout performed and anesthesia consent Epidural:  
Patient position:  Seated Prep region:  Lumbar Prep: Betadine Location:  L3-4 Needle and Epidural Catheter:  
Needle Type:  Tuohy Needle Gauge:  17 G Injection Technique:  Loss of resistance using air Attempts:  1 Catheter Size:  18 G Catheter at Skin Depth (cm):  13 Depth in Epidural Space (cm):  8 Events: no paresthesia and negative aspiration test   
Test Dose:  Lidocaine 1.5% w/ epi and negative Assessment:  
Catheter Secured:  Tegaderm and tape Insertion:  Uncomplicated Patient tolerance:  Patient tolerated the procedure well with no immediate complications

## 2018-10-26 PROCEDURE — 65270000029 HC RM PRIVATE

## 2018-10-26 PROCEDURE — 74011250637 HC RX REV CODE- 250/637: Performed by: STUDENT IN AN ORGANIZED HEALTH CARE EDUCATION/TRAINING PROGRAM

## 2018-10-26 RX ADMIN — IBUPROFEN 800 MG: 800 TABLET ORAL at 10:01

## 2018-10-26 RX ADMIN — IBUPROFEN 800 MG: 800 TABLET ORAL at 19:16

## 2018-10-26 NOTE — PROGRESS NOTES
Post-Partum Day Number 1 Progress Note [facility] Information for the patient's :  Tushar Jolly, Female [[id number]] Vaginal, Spontaneous Patient doing well without significant complaint. Voiding without difficulty, normal lochia. Vitals: 
Visit Vitals /68 (BP 1 Location: Right arm, BP Patient Position: Sitting) Pulse (!) 106 Temp 98.1 °F (36.7 °C) Resp 16 Ht 5' 6\" (1.676 m) Wt 119.3 kg (263 lb) SpO2 100% Breastfeeding? Yes  
BMI 42.45 kg/m² Temp (24hrs), Av.9 °F (37.2 °C), Min:98.1 °F (36.7 °C), Max:99.6 °F (37.6 °C) Exam:   Patient without distress. FF @ U-2 NT 
              LE NT w/o edema Labs:  
 
Lab Results Component Value Date/Time WBC 13.1 (H) 10/24/2018 04:30 PM  
 WBC 6.8 2017 06:30 PM  
 HGB 9.7 (L) 10/24/2018 04:30 PM  
 HGB 10.7 (L) 2017 06:30 PM  
 HCT 31.0 (L) 10/24/2018 04:30 PM  
 HCT 34.5 (L) 2017 06:30 PM  
 PLATELET 037  04:30 PM  
 PLATELET 659  06:30 PM  
 Hgb, External 11.4 2018 Hct, External 35.8 2018 No results found for this or any previous visit (from the past 24 hour(s)). Assessment: PPD 1 s/p , stable Plan: 1. Continue routine postpartum care. 2.  Breastfeeding, baby girl. 3.  Monitor BP and HR. Togn Winters DO, FACOG [facility]

## 2018-10-26 NOTE — PROGRESS NOTES
Bedside and Verbal shift change report given to CHRISTINE Grande RN (oncoming nurse) by LUCY Mauricio RN (offgoing nurse). Report included the following information SBAR, Kardex, Intake/Output and MAR.

## 2018-10-26 NOTE — ROUTINE PROCESS
Bedside and Verbal shift change report given to Cynthia Wei RN (oncoming nurse) by Ericka Oneil RN (offgoing nurse). Report included the following information SBAR.

## 2018-10-26 NOTE — ROUTINE PROCESS
Bedside and Verbal shift change report given to Zo Peña RN (oncoming nurse) by MARLON Alan RN (offgoing nurse). Report included the following information SBAR, Kardex, Procedure Summary, Intake/Output, MAR and Recent Results.

## 2018-10-26 NOTE — LACTATION NOTE
This note was copied from a baby's chart. Problem: Lactation Care Plan Goal: *Infant latching appropriately Outcome: Progressing Towards Goal 
Mom states baby nursing well. Discussed mom's questions on how to know if baby is getting anything and how to know how long they should be on breast.  
Pt will successfully establish breastfeeding by feeding in response to early feeding cues  
or wake every 3h, will obtain deep latch, and will keep log of feedings/output. Taught to BF at hunger cues and or q 2-3 hrs and to offer 10-20 drops of hand expressed colostrum at any non-feeds.   
  
Breast Assessment Left Breast: Large, Extra large Left Nipple: Everted, Intact Right Breast: Large, Extra large Right Nipple: Intact, Everted Breast- Feeding Assessment Attends Breast-Feeding Classes: No 
Breast-Feeding Experience: No 
Breast Trauma/Surgery: No 
Type/Quality: (per mom, not seen at breast) 
  
  
Goal: *Weight loss less than 10% of birth weight Outcome: Progressing Towards Goal 
Discussed eating a healthy diet. Instructed mother to eat a variety of foods in order to get a well balanced diet. She should consume an extra 300-500 calories per day (more than her non-pregnant requirement.) These extra calories will help provide energy needed for optimal breast milk production. Mother also encouraged to \"drink to thirst\" and it is recommended that she drink fluids such as water and fruit/vegetable juice. Nutritious snacks should be available so that she can eat throughout the day to help satisfy her hunger and maintain a good milk supply. Continue taking your prenatal vitamins as long as you breast feed.  
  
  
Encouraged mom to attempt feeding with baby led feeding cues. Just as sucking on fingers, rooting, mouthing. Looking for 8-12 feedings in 24 hours. Don't limit baby at breast, allow baby to come of breast on it's own.  Baby may want to feed  often and may increase number of feedings on second day of life. Skin to skin encouraged.  
  
 If baby doesn't nurse,  Mom should  hand express  10-20 drops of colostrum and drip into baby's mouth, or give to baby by finger feeding, cup feeding, or spoon feeding at least every 2-3 hours.  
  
Problem: Patient Education: Go to Patient Education Activity Goal: Patient/Family Education Outcome: Progressing Towards Goal 
Reviewed breastfeeding basics:  Supply and demand,  stomach size, early  Feeding cues, skin to skin, positioning and baby led latch-on, assymetrical latch with signs of good, deep latch vs shallow, feeding frequency and duration, and log sheet for tracking infant feedings and output. Breastfeeding Booklet and Warm line information given. Discussed typical  weight loss and the importance of infant weight checks with pediatrician 1-2 post discharge. 
  
Discussed with mother her plan for feeding. Reviewed the benefits of exclusive breast milk feeding during the hospital stay. Informed her of the risks of using formula to supplement in the first few days of life as well as the benefits of successful breast milk feeding; referred her to the Breastfeeding booklet about this information. She acknowledges understanding of information reviewed and states that it is her plan to breast feed her infant.   Will support her choice and offer additional information as needed.

## 2018-10-27 VITALS
SYSTOLIC BLOOD PRESSURE: 122 MMHG | WEIGHT: 263 LBS | OXYGEN SATURATION: 100 % | HEIGHT: 66 IN | DIASTOLIC BLOOD PRESSURE: 69 MMHG | TEMPERATURE: 98.1 F | RESPIRATION RATE: 16 BRPM | HEART RATE: 87 BPM | BODY MASS INDEX: 42.27 KG/M2

## 2018-10-27 PROCEDURE — 90686 IIV4 VACC NO PRSV 0.5 ML IM: CPT | Performed by: OBSTETRICS & GYNECOLOGY

## 2018-10-27 PROCEDURE — 74011250637 HC RX REV CODE- 250/637: Performed by: STUDENT IN AN ORGANIZED HEALTH CARE EDUCATION/TRAINING PROGRAM

## 2018-10-27 PROCEDURE — 77030036554

## 2018-10-27 PROCEDURE — 90471 IMMUNIZATION ADMIN: CPT

## 2018-10-27 PROCEDURE — 74011250636 HC RX REV CODE- 250/636: Performed by: OBSTETRICS & GYNECOLOGY

## 2018-10-27 RX ORDER — OXYCODONE AND ACETAMINOPHEN 5; 325 MG/1; MG/1
1 TABLET ORAL
Qty: 10 TAB | Refills: 0 | Status: SHIPPED | OUTPATIENT
Start: 2018-10-27 | End: 2021-09-02 | Stop reason: ALTCHOICE

## 2018-10-27 RX ADMIN — IBUPROFEN 800 MG: 800 TABLET ORAL at 03:39

## 2018-10-27 RX ADMIN — INFLUENZA VIRUS VACCINE 0.5 ML: 15; 15; 15; 15 SUSPENSION INTRAMUSCULAR at 09:17

## 2018-10-27 NOTE — ROUTINE PROCESS
Bedside and Verbal shift change report given to Clarence Juarez RN (oncoming nurse) by Frankie Cazares RN (offgoing nurse). Report included the following information SBAR, Kardex, Intake/Output, MAR and Accordion.

## 2018-10-27 NOTE — PROGRESS NOTES
PostPartum Note [facility] [id number] 
1995 
26 y.o. 
 
S:  Ms. [facility] is a 26 y.o.  PPD #2 s/p  @ 38w5d. Doing well. She had a baby girl. Her lochia is like a period. She describes her pain as mild and is well controlled with PO medications. She is breast feeding and this is going well. She is ambulating and voiding. Tolerating PO intake. O:  
Visit Vitals /69 (BP 1 Location: Right arm, BP Patient Position: At rest) Pulse 87 Temp 98.1 °F (36.7 °C) Resp 16 Ht 5' 6\" (1.676 m) Wt 119.3 kg (263 lb) SpO2 100% Breastfeeding? Yes  
BMI 42.45 kg/m² Lab Results Component Value Date/Time WBC 13.1 (H) 10/24/2018 04:30 PM  
 HGB 9.7 (L) 10/24/2018 04:30 PM  
 HCT 31.0 (L) 10/24/2018 04:30 PM  
 PLATELET 964 54/10/8788 04:30 PM  
 MCV 77.5 (L) 10/24/2018 04:30 PM  
 Hgb, External 11.4 2018 Hct, External 35.8 2018 Gen - No acute distress Abdomen - Fundus firm, below the umbilicus Ext - Warm, well perfused. Nontender A/P:  PPD #1 s/p  @ 38w5d doing well. 1.  Routine PP instructions/ care discussed 2. Blood type - Rh + 
3. Rubella imm 4. Circumcision n/a  
5. Discharge today 6. F/U 4-6 weeks for PP check. Jeffery Bullard MD 
[facility]

## 2018-10-27 NOTE — PROGRESS NOTES
Pt was given discharge instructions and verbalized understanding. Pt states that she will monitor her blood pressure and states she knows what symptoms to look for. Pt states that she will make a 6 week follow up appointment. Pt was discharged in stable condition.

## 2018-10-27 NOTE — DISCHARGE INSTRUCTIONS
Discharge Instructions for Vaginal Delivery    Patient ID:  Eudelia Boxer  [id number]  89 y.o.  1995    Take Home Medications     Continue taking your prenatal vitamins if you are breastfeeding. Follow-up care is a key part of your treatment and safety. Please schedule and keep appointments. Follow-up with your primary OB in 6 weeks. Activity  Avoid anything in your vagina for 6 weeks (no intercourse, tampons, or douching). You may drive unless you are taking prescription pain medications. Climbing stairs and light lifting are okay. Please avoid excessive exercise, though walking is okay- you'll be tired! Diet  Regular diet as tolerated. Be sure to drink plenty of fluids if you are breastfeeding. Wound care  If you have stitches, continue to rinse with a squirt bottle of warm water each time you void for about 7-10 days. .  Your stitches will gradually dissolve over four to eight weeks. Sitz baths are also helpful to keep the wound clean, encourage healing, and to help with pain associated with the stitches or hemorrhoids. You can use either a sitz bath basin or a bathtub filled with 2-3\" inches of plain warm water. Soak for 10 minutes 3 times a day as tolerated. Pain Management  1. Over the counter medications such as Tylenol and ibuprofen (Motrin or Advil) are ideal.  These may be taken together, alternating doses. You may  take the maximum dose:  Motrin or Advil (generic ibuprofen), either 3 tablets every 6 hours or 4 tablets every 8 hours or Tylenol (acetominophen) 1000mg every 6 hours (equivalent to 2 extra strength Tylenol). 2. You may also have a precrescription for stronger pain medication. Take only as needed and transition to over the counter medication in the next few days. Minimize amounts of the prescription medication, as it can be habit-forming and will worsen or cause constipation.  Most patients will find that within a couple of days, their pain is adequately controlled using only over-the-counter medications. 3. The prescription pain medication is mixed with Tylenol, therefore, you should not take any extra Tylenol or acetaminophen until you have reduced your prescription pain medication. 4. Add heating pad or sitz baths as needed. Add hemorrhoid wipes or ointments if needed    Constipation  1. Constipation is normal after pregnancy and delivery, especially while taking prescription narcotic pain medication. 2. Over the counter remedies including ducosate (Colace), take 1-2 capsules 1-2 times daily for soft stool as needed. You may also add/ try milk of magnesia or rectal remedies such as Dulcolax or Fleets enema. Recovery: What to Expect at Home  1. Fatigue is expected. Try to rest when you can and don't worry about doing housework or other tasks which can wait. 2. The soreness along your bottom will improve significantly over the first 2 weeks, but it may take 6 weeks before you are completely recovered. 3. Back pain or general body aches or muscle soreness are expected and should improve with acetominophen or ibuprofen. 4. Leg swelling due to pregnancy and/or IV fluids given in the hospital will take about two weeks to resolve. 5. Most women experience some form of the \"Baby Blues\" after having a baby. Feeling emotional, tearful, frustrated, anxious, sad, and irritable some of the time is normal and go away after about 2 weeks. Adequate rest and help from your family will help. Take breaks from caring for the baby. Call your doctor if your symptoms seem severe, last more than 2 weeks, or seem to be getting worse instead of better. Get help immediately if you have thoughts of wanting to hurt yourself or others! Call your doctor or seek immediate medical care if you have:  Heavy vaginal bleeding, soaking through one or more pads an hour for several hours. Foul-smelling discharge from your vagina or incision.   Consistent nausea and vomiting and cannot keep fluids down. Consistent pain that does not get better after you take pain medicine. Sudden chest pain and shortness of breath  Signs of a blood clot: pain/ swelling/ increasing redness in your lower extremeties  Signs of infection: increased pain in your abdomen or vaginal area; red streaks, warmth, or tenderness of your breasts; fever of 100.5 F or greater  POSTPARTUM DISCHARGE INSTRUCTIONS       Name:  Hilda Vallejo  YOB: 1995  Admission Diagnosis:  Pregnancy  Gestational hypertension     Discharge Diagnosis:    Problem List as of 10/27/2018 Never Reviewed          Codes Class Noted - Resolved    Gestational hypertension ICD-10-CM: O13.9  ICD-9-CM: 642.30  10/24/2018 - Present            Attending Physician:  Tasha Mary MD    Delivery Type:  Vaginal Childbirth with Episiotomy, Laceration or Tear: What To Expect At [address] body will slowly heal in the next few weeks. It is easy to get too tired and overwhelmed during the first weeks after your baby is born. Changes in your hormones can shift your mood without warning. You may find it hard to meet the extra demands on your energy and time. Take it easy on yourself. Follow-up care is a key part of your treatment and safety. Be sure to make and go to all appointments, and call your doctor if you are having problems. It's also a good idea to know your test results and keep a list of the medicines you take. How can you care for yourself at home? Vaginal Bleeding and Cramps  · After delivery, you will have a bloody discharge from the vagina. This will turn pink within a week and then white or yellow after about 10 days. It may last for 2 to 4 weeks or longer, until the uterus has healed. Use pads instead of tampons until you stop bleeding. · Do not worry if you pass some blood clots, as long as they are smaller than a golf ball.  If you have a tear or stitches in your vaginal area, change the pad at least every 4 hours to prevent soreness and infection. · You may have cramps for the first few days after childbirth. These are normal and occur as the uterus shrinks to normal size. Take an over-the-counter pain medicine, such as acetaminophen (Tylenol), ibuprofen (Advil, Motrin), or naproxen (Aleve), for cramps. Read and follow all instructions on the label. Do not take aspirin, because it can cause more bleeding. Do not take acetaminophen (Tylenol) and other acetaminophen containing medications (i.e. Percocet) at the same time. Episiotomy, Lacerations or Tears  · If you have stitches, they will dissolve on their own and do not need to be removed. · Put ice or a cold pack on your painful area for 10 to 20 minutes at a time, several times a day, for the first few days. Put a thin cloth between the ice and your skin. · Sit in a few inches of warm water (sitz bath) 3 times a day and after bowel movements. The warm water helps with pain and itching. If you do not have a tub, a warm shower might help. Breast fullness  · Your breasts may overfill (engorge) in the first few days after delivery. To help milk flow and to relieve pain, warm your breasts in the shower or by using warm, moist towels before nursing. · If you are not nursing, do not put warmth on your breasts or touch your breasts. Wear a tight bra or sports bra and use ice until the fullness goes away. This usually takes 2 to 3 days. · Put ice or a cold pack on your breast after nursing to reduce swelling and pain. Put a thin cloth between the ice and your skin. Activity  · Eat a balanced diet. Do not try to lose weight by cutting calories. Keep taking your prenatal vitamins, or take a multivitamin. · Get as much rest as you can. Try to take naps when your baby sleeps during the day. · Get some exercise every day. But do not do any heavy exercise until your doctor says it is okay.   · Wait until you are healed (about 4 to 6 weeks) before you have sexual intercourse. Your doctor will tell you when it is okay to have sex. · Talk to your doctor about birth control. You can get pregnant even before your period returns. Also, you can get pregnant while you are breast-feeding. Mental Health  · Many women get the \"baby blues\" during the first few days after childbirth. You may lose sleep, feel irritable, and cry easily. You may feel happy one minute and sad the next. Hormone changes are one cause of these emotional changes. Also, the demands of a new baby, along with visits from relatives or other family needs, add to a mother's stress. The \"baby blues\" often peak around the fourth day. Then they ease up in less than 2 weeks. · If your moodiness or anxiety lasts for more than 2 weeks, or if you feel like life is not worth living, you may have postpartum depression. This is different for each mother. Some mothers with serious depression may worry intensely about their infant's well-being. Others may feel distant from their child. Some mothers might even feel that they might harm their baby. A mother may have signs of paranoia, wondering if someone is watching her. · With all the changes in your life, you may not know if you are depressed. Pregnancy sometimes causes changes in how you feel that are similar to the symptoms of depression. · Symptoms of depression include:  · Feeling sad or hopeless and losing interest in daily activities. These are the most common symptoms of depression. · Sleeping too much or not enough. · Feeling tired. You may feel as if you have no energy. · Eating too much or too little. · [employer] ([employer]) offers a Warm line; Chat with the Expert phone sessions; Information and Articles about Pregnancy and Postpartum Mood Disorders; Comprehensive List of Free Support Groups; Knowledgeable local coordinators who will offer support, information, and resources; Guide to Resources on [employer];  Calendar of events in the  mood disorders community; Latest News and Research; and [facility] [address] for [employer]. Remember - You are not alone; You are not to blame; With help, you will be well. 1-967-213-PPD(9425). WWW. POSTPARTUM. NET    · Writing or talking about death, such as writing suicide notes or talking about guns, knives, or pills. Keep the numbers for these national suicide hotlines: 9-043-413-TALK (9-514.918.1921) and 5-035-KCHSOVS (8-835.120.6033). If you or someone you know talks about suicide or feeling hopeless, get help right away. Constipation and Hemorrhoids  Drink plenty of fluids, enough so that your urine is light yellow or clear like water. If you have kidney, heart, or liver disease and have to limit fluids, talk with your doctor before you increase the amount of fluids you drink. · Eat plenty of fiber each day. Have a bran muffin or bran cereal for breakfast, and try eating a piece of fruit for a mid-afternoon snack. · For painful, itchy hemorrhoids, put ice or a cold pack on the area several times a day for 10 minutes at a time. Follow this by putting a warm compress on the area for another 10 to 20 minutes or by sitting in a shallow, warm bath. When should you call for help? Call 911 anytime you think you may need emergency care. For example, call if:  · You are thinking of hurting yourself, your baby, or anyone else. · You passed out (lost consciousness). · You have symptoms of a blood clot in your lung (called a pulmonary embolism). These may include:  · Sudden chest pain. · Trouble breathing. · Coughing up blood. Call your doctor now or seek immediate medical care if:  · You have severe vaginal bleeding. · You are soaking through a pad each hour for 2 or more hours. · Your vaginal bleeding seems to be getting heavier or is still bright red 4 days after delivery. · You are dizzy or lightheaded, or you feel like you may faint.   · You are vomiting or cannot keep fluids down.  · You have a fever. · You have new or more belly pain. · You pass tissue (not just blood). · Your vaginal discharge smells bad. · Your belly feels tender or full and hard. · Your breasts are continuously painful or red. · You feel sad, anxious, or hopeless for more than a few days. · You have sudden, severe pain in your belly. · You have symptoms of a blood clot in your leg (called a deep vein thrombosis), such as:  · Pain in your calf, back of the knee, thigh, or groin. · Redness and swelling in your leg or groin. · You have symptoms of preeclampsia, such as:  · Sudden swelling of your face, hands, or feet. · New vision problems (such as dimness or blurring). · A severe headache. · Your blood pressure is higher than it should be or rises suddenly. · You have new nausea or vomiting. Watch closely for changes in your health, and be sure to contact your doctor if you have any problems. Additional Information:  Learning About Hypertensive Disorders After Childbirth    What is preeclampsia? A woman with preeclampsia has blood pressure that is higher than usual. She may also have other serious symptoms. Preeclampsia can be dangerous. When it is severe, it can cause seizures (eclampsia) or liver or kidney damage. When the liver is affected, some women get HELLP syndrome, a blood-clotting and bleeding problem. HELLP can come on quickly and can be deadly. This is why your doctor checks you and your baby often. Preeclampsia usually occurs after 20 weeks of pregnancy. In rare cases, it is first noted right after childbirth. Most often, it starts near the end of pregnancy and goes away after childbirth. What are the symptoms? Mild preeclampsia usually doesn't cause symptoms. But preeclampsia can cause rapid weight gain and sudden swelling of the hands and face. Severe preeclampsia does cause symptoms. It can cause a very bad headache and trouble seeing and breathing.  It also can cause belly pain. You may also urinate less than usual.    If you have new preeclampsia symptoms after you go home from the hospital, call your doctor right away. What can you expect after you have had preeclampsia? In the hospital  After the baby and the placenta are delivered, preeclampsia usually starts to improve. Most women get better in the first few days after childbirth. After having preeclampsia, you still have a risk of seizures for a day or more after childbirth. (Very rarely, seizures happen later on.) So your doctor may have you take magnesium sulfate for a day or more to prevent seizures. You may also take medicine to lower your blood pressure. When you go home  Your blood pressure will most likely return to normal a few days after delivery. Your doctor will want to check your blood pressure sometime in the first week after you leave the hospital.    Some women still have high blood pressure 6 weeks after childbirth. But most return to normal levels over the long term. · Take and record your blood pressure at home if your doctor tells you to. · Learn the importance of the two measures of blood pressure (such as 120 over 80, or 120/80). The first number is the systolic pressure. This is the force of blood on the artery walls as the heart pumps. The second number is the diastolic pressure. This is the force of blood on the artery walls between heartbeats, when the heart is at rest. You have a choice of monitors to use. Manual monitor: You pump up the cuff and use a stethoscope to listen for your  Pulse. · Electronic monitor: The cuff inflates, and a gauge shows your pulse rate. · To take your blood pressure:  · Ask your doctor to check your blood pressure monitor to be sure that it is accurate and that the cuff fits you. Also ask your doctor to watch you use it, to make sure that you are using it right.   · You should not eat, use tobacco products, or use medicine known to raise blood pressure (such as some nasal decongestant sprays) before you take your blood pressure. · Avoid taking your blood pressure if you have just exercised or are nervous or upset. Rest at least 15 minutes before you take your blood pressure. · Be safe with medicines. If you take medicine, take it exactly as prescribed. Call your doctor if you think you are having a problem with your medicine. · Do not smoke. Quitting smoking will help lower your blood pressure and improve your baby's growth and health. If you need help quitting, talk to your doctor about stop-smoking programs and medicines. These can increase your chances of quitting for good. · Eat a balanced and healthy diet that has lots of fruits and vegetables. Long-term health   After you have had preeclampsia, you have a higher-than-average risk of heart disease, stroke, and kidney disease. This may be because the same things that cause preeclampsia also cause heart and kidney disease. To protect your health, work with your doctor on living a heart-healthy lifestyle and getting the checkups you need. Your doctor may also want you to check your blood pressure at home. Follow-up care is a key part of your treatment and safety. Be sure to make and go to all appointments, and call your doctor if you are having problems. It's also a good idea to know your test results and keep a list of the medicines you take. These are general instructions for a healthy lifestyle:    No smoking/ No tobacco products/ Avoid exposure to second hand smoke    Surgeon General's Warning:  Quitting smoking now greatly reduces serious risk to your health.     Obesity, smoking, and sedentary lifestyle greatly increases your risk for illness    A healthy diet, regular physical exercise & weight monitoring are important for maintaining a healthy lifestyle    Recognize signs and symptoms of STROKE:    F-face looks uneven    A-arms unable to move or move unevenly    S-speech slurred or non-existent    T-time-call 911 as soon as signs and symptoms begin - DO NOT go       back to bed or wait to see if you get better - TIME IS BRAIN. I have had the opportunity to make my options or choices for discharge. I have received and understand these instructions.

## 2018-10-27 NOTE — LACTATION NOTE
This note was copied from a baby's chart. Mother states Bf is going well. Mothers milk is, breasts palpate filling. Engorgement and discharge info reviewed. Mothers questions answered. Chart shows numerous feedings, void, stool WNL. Discussed importance of monitoring outputs and feedings on first week of life. Discussed ways to tell if baby is  getting enough breast milk, ie  voids and stools, change in color of stool, and return to birth wt within 2 weeks. Follow up with pediatrician visit for weight check in 1-2 days (per AAP guidelines.)  Encouraged to call Warm Line  [phone] or [facility] at [phone] for any questions/problems that arise. Mother also given breastfeeding support group dates and times for any future needs Engorgement Care Guidelines:  Reviewed how milk is made and normal phases of milk production. Taught care of engorged breasts - frequent breastfeeding encouraged, cool packs and motrin as tolerated. Anticipatory guidance shared. Pt will successfully establish breastfeeding by feeding in response to early feeding cues  
or wake every 3h, will obtain deep latch, and will keep log of feedings/output. Taught to BF at hunger cues and or q 2-3 hrs and to offer 10-20 drops of hand expressed colostrum at any non-feeds. Breast Assessment Left Breast: Large Left Nipple: Everted, Intact Right Breast: Large Right Nipple: Everted, Intact Breast- Feeding Assessment Attends Breast-Feeding Classes: No 
Breast-Feeding Experience: No 
Breast Trauma/Surgery: No 
Type/Quality: (per mom, not seen at breast) Lactation Consultant Visits Breast-Feedings: Good Mother/Infant Observation Mother Observation: Breast comfortable

## 2021-09-02 ENCOUNTER — OFFICE VISIT (OUTPATIENT)
Dept: FAMILY MEDICINE CLINIC | Age: 26
End: 2021-09-02
Payer: COMMERCIAL

## 2021-09-02 VITALS
TEMPERATURE: 98.6 F | BODY MASS INDEX: 41.27 KG/M2 | WEIGHT: 256.8 LBS | HEART RATE: 96 BPM | OXYGEN SATURATION: 98 % | SYSTOLIC BLOOD PRESSURE: 108 MMHG | DIASTOLIC BLOOD PRESSURE: 70 MMHG | HEIGHT: 66 IN

## 2021-09-02 DIAGNOSIS — E66.01 CLASS 3 SEVERE OBESITY DUE TO EXCESS CALORIES WITHOUT SERIOUS COMORBIDITY WITH BODY MASS INDEX (BMI) OF 40.0 TO 44.9 IN ADULT (HCC): Primary | ICD-10-CM

## 2021-09-02 DIAGNOSIS — Z11.59 ENCOUNTER FOR HEPATITIS C SCREENING TEST FOR LOW RISK PATIENT: ICD-10-CM

## 2021-09-02 DIAGNOSIS — L73.2 HIDRADENITIS SUPPURATIVA: ICD-10-CM

## 2021-09-02 DIAGNOSIS — Z76.89 ENCOUNTER TO ESTABLISH CARE: ICD-10-CM

## 2021-09-02 DIAGNOSIS — Q44.7: ICD-10-CM

## 2021-09-02 PROCEDURE — 99203 OFFICE O/P NEW LOW 30 MIN: CPT | Performed by: STUDENT IN AN ORGANIZED HEALTH CARE EDUCATION/TRAINING PROGRAM

## 2021-09-02 RX ORDER — NORETHINDRONE ACETATE AND ETHINYL ESTRADIOL 1; .02 MG/1; MG/1
1 TABLET ORAL DAILY
COMMUNITY
Start: 2021-08-23

## 2021-09-02 RX ORDER — LORATADINE 10 MG/1
10 TABLET ORAL
COMMUNITY

## 2021-09-02 NOTE — PROGRESS NOTES
Chief Complaint   Patient presents with    New Patient     establishing care. Last pap: 08/2020     Visit Vitals  /70 (BP 1 Location: Left upper arm, BP Patient Position: Sitting)   Pulse 96   Temp 98.6 °F (37 °C) (Oral)   Ht 5' 6\" (1.676 m)   Wt 256 lb 12.8 oz (116.5 kg)   SpO2 98%   BMI 41.45 kg/m²     1. Have you been to the ER, urgent care clinic since your last visit? Hospitalized since your last visit? No    2. Have you seen or consulted any other health care providers outside of the [address] [place] [address] since your last visit? Include any pap smears or colon screening.  No

## 2021-09-02 NOTE — PROGRESS NOTES
Jeannette Reyna is an 23 y.o. female who presents to [facility]   Patient was previously receiving care at: not seeing PCP. Medical history significant for:    HX of small lesion on liver. See CT below. HX of Hydradenitis Suppurativa. Currently not having any complaints currently. CT Results (most recent):  Results from Hospital Encounter encounter on 12/03/17    CT ABD PELV W CONT    Narrative  EXAM:  CT ABD PELV W CONT    INDICATION: lower and right sided abd pain, eval for appy or colitis    COMPARISON: None    CONTRAST:  100 mL of Isovue-370. TECHNIQUE:  Following the uneventful intravenous administration of contrast, thin axial  images were obtained through the abdomen and pelvis. Coronal and sagittal  reconstructions were generated. Oral contrast was not administered. CT dose  reduction was achieved through use of a standardized protocol tailored for this  examination and automatic exposure control for dose modulation. FINDINGS:  LUNG BASES: Clear. INCIDENTALLY IMAGED HEART AND MEDIASTINUM: Unremarkable. LIVER: There is a vascular lesion in the right lobe of the liver with maximal  dimension 3.6 cm. GALLBLADDER: Unremarkable. SPLEEN: No mass. PANCREAS: No mass or ductal dilatation. ADRENALS: Unremarkable. KIDNEYS: No mass, calculus, or hydronephrosis. STOMACH: Unremarkable. SMALL BOWEL: No dilatation or wall thickening. COLON: No dilatation or wall thickening. APPENDIX: Unremarkable. PERITONEUM: No ascites or pneumoperitoneum. RETROPERITONEUM: No lymphadenopathy or aortic aneurysm. REPRODUCTIVE ORGANS: Normal.  URINARY BLADDER: No mass or calculus. BONES: No destructive bone lesion. ADDITIONAL COMMENTS: N/A    Impression  IMPRESSION:    1. No acute findings. 2. Right lobe of the liver lesion which should further be evaluated by MRI. Car Marcano for GYN care - UTD on paps.   UTD on gardisil    Review of Systems   General/Constitutional:   No headache, fever, fatigue, weight loss or weight gain       Eyes:   No redness, pruritis, pain, visual changes, swelling, or discharge      Ears:    No pain, loss or changes in hearing     Neck:   No swelling, masses, stiffness, pain, or limited movement     Cardiac:    No chest pain      Respiratory:   No cough or shortness of breath     GI:   No nausea/vomiting, diarrhea, abdominal pain, bloody or dark stools       :   No dysuria or  hematuria    Neurological:   No loss of consciousness, dizziness, seizures, dysarthria, cognitive changes, memory changes,  problems with balance, or unilateral weakness     Skin: No rash     Current Medications  Current medications include:   Current Outpatient Medications   Medication Sig    norethindrone-ethinyl estradiol (MICROGESTIN 1/20) 1-20 mg-mcg tablet Take 1 Tablet by mouth daily.  loratadine (Claritin) 10 mg tablet Take 10 mg by mouth daily as needed for Allergies. No current facility-administered medications for this visit. Allergies  Allergies   Allergen Reactions    Food [Kiwi] Itching       Past Medical History  Past Medical History:   Diagnosis Date    Anemia     Gestational hypertension     Gestational hypertension 10/24/2018    Vascular malformation of liver 9/2/2021       Past Surgical History   Past Surgical History:   Procedure Laterality Date    HX WISDOM TEETH EXTRACTION Bilateral        Family History  Family History   Problem Relation Age of Onset    Hypertension Mother     Hypertension Maternal Grandmother     Ovarian Cancer Maternal Grandmother         and cervical    Diabetes Paternal Grandmother     Cancer Paternal Grandmother         breast    Hypertension Paternal Grandmother     Hypertension Father         preDM     No FH of colon cancer.     Social History  Social History     Socioeconomic History    Marital status: SINGLE     Spouse name: Not on file    Number of children: Not on file    Years of education: Not on file    Highest education level: Not on file   Occupational History    Not on file   Tobacco Use    Smoking status: Never Smoker    Smokeless tobacco: Never Used   Substance and Sexual Activity    Alcohol use: Yes     Comment: occasionally     Drug use: No    Sexual activity: Yes     Partners: Male     Birth control/protection: None   Other Topics Concern     Service Not Asked    Blood Transfusions Not Asked    Caffeine Concern Not Asked    Occupational Exposure Not Asked    Hobby Hazards Not Asked    Sleep Concern Not Asked    Stress Concern Not Asked    Weight Concern Not Asked    Special Diet Not Asked    Back Care Not Asked    Exercise Not Asked    Bike Helmet Not Asked   [address] Not Asked    Self-Exams Not Asked   Social History Narrative    Not on file     Social Determinants of Health     Financial Resource Strain:     Difficulty of Paying Living Expenses:    Food Insecurity:     Worried About Running Out of Food in the Last Year:     [address] in the Last Year:    Transportation Needs:     Lack of Transportation (Medical):      Lack of Transportation (Non-Medical):    Physical Activity:     Days of Exercise per Week:     Minutes of Exercise per Session:    Stress:     Feeling of Stress :    Social Connections:     Frequency of Communication with Friends and Family:     Frequency of Social Gatherings with Friends and Family:     Attends Yazdanism Services:     Active Member of Clubs or Organizations:     Attends Club or Organization Meetings:     Marital Status:    Intimate Partner Violence:     Fear of Current or Ex-Partner:     Emotionally Abused:     Physically Abused:     Sexually Abused:        Immunizations  Immunization History   Administered Date(s) Administered    Covid-19, MODERNA, Mrna, Lnp-s, Pf, 100mcg/0.5mL 06/02/2021, 06/30/2021    Influenza Vaccine LSEO) PF (>6 Mo Flulaval, Fluarix, and >3 Patrizia Case, [facility]) 10/27/2018       Health Maintenance  Hepatitis C testing will see today. Objective   Vital Signs  Visit Vitals  /70 (BP 1 Location: Left upper arm, BP Patient Position: Sitting)   Pulse 96   Temp 98.6 °F (37 °C) (Oral)   Ht 5' 6\" (1.676 m)   Wt 256 lb 12.8 oz (116.5 kg)   SpO2 98%   BMI 41.45 kg/m²       Physical Examination  GEN: No apparent distress. Alert and oriented and responds to all questions appropriately. EYES:  Conjunctiva clear; pupils round and reactive to light; extraocular movements areintact. EAR: External ears are normal.  Tympanic membranes are clear and without effusion  NECK:  Supple; no masses; thyroid normal           LUNGS: Respirations unlabored; clear to auscultation bilaterally  CARDIOVASCULAR: Regular, rate, and rhythm without murmurs, gallops or rubs   ABDOMEN: Soft; nontender; nondistended; normoactive bowel sounds; no masses or organomegaly  NEUROLOGIC:  No focal neurologic deficits. Strength and sensation grossly intact. Coordination and gait grossly intact. EXT: Well perfused. No edema. SKIN: No obvious rashes. Some HS lesions of R axilla. Does not appear infected. Assessment:   Tamia Calixto is a 23 y.o. here to establish to care     Plan:       ICD-10-CM ICD-9-CM    1. Class 3 severe obesity due to excess calories without serious comorbidity with body mass index (BMI) of 40.0 to 44.9 in adult ([facility].)  E66.01 278.01 LIPID PANEL    Z68.41 V85.41 REFERRAL TO NUTRITION      LIPID PANEL   2. Vascular malformation of liver  Q44.7 751.69    3. Hidradenitis suppurativa  L73.2 705.83    4. Encounter to establish care  Z76.89 V65.8 CBC W/O DIFF      METABOLIC PANEL, COMPREHENSIVE      METABOLIC PANEL, COMPREHENSIVE      CBC W/O DIFF   5.  Encounter for hepatitis C screening test for low risk patient  Z11.59 V73.89 HEPATITIS C AB      HEPATITIS C AB         · Counseled re: diet, exercise, healthy lifestyle  · Appropriate labs and referrals ordered as listed above  · CT abd in 2017 incidentally showed vascular lesion of R lobe of the liver - suggested MRI be done, but given no new symptoms, patient declines pursuing MRI at this time. Disc if LFTs abnormal or symptoms arise, should get MRI ASAP. · Hydratenitis: has some lesions in axilla now, uses benzoyl peroxide which helps. · Discussed the patient's BMI with her. The BMI follow up plan is as follows: Referred to Dietitian. · The patient was counseled on the dangers of tobacco use, and was advised to quit. Reviewed strategies to maximize success, including written materials. I discussed the aforementioned diagnoses with the patient as well as the plan of care. All questions were answered and an AVS was provided.      I discussed this patient with Dr. Pipe Jackson (Attending Physician)      Signed By:  Lavinia Miller DO

## 2021-09-02 NOTE — PATIENT INSTRUCTIONS
Eating Healthy Foods: Care Instructions  Your Care Instructions     Eating healthy foods can help lower your risk for disease. Healthy food gives you energy and keeps your heart strong, your brain active, your muscles working, and your bones strong. A healthy diet includes a variety of foods from the basic food groups: grains, vegetables, fruits, milk and milk products, and meat and beans. Some people may eat more of their favorite foods from only one food group and, as a result, miss getting the nutrients they need. So, it is important to pay attention not only to what you eat but also to what you are missing from your diet. You can eat a healthy, balanced diet by making a few small changes. Follow-up care is a key part of your treatment and safety. Be sure to make and go to all appointments, and call your doctor if you are having problems. It's also a good idea to know your test results and keep a list of the medicines you take. How can you care for yourself at home? Look at what you eat  · Keep a food diary for a week or two and record everything you eat or drink. Track the number of servings you eat from each food group. · For a balanced diet every day, eat a variety of:  ? 6 or more ounce-equivalents of grains, such as cereals, breads, crackers, rice, or pasta, every day. An ounce-equivalent is 1 slice of bread, 1 cup of ready-to-eat cereal, or ½ cup of cooked rice, cooked pasta, or cooked cereal.  ? 2½ cups of vegetables, especially:  § Dark-green vegetables such as broccoli and spinach. § Orange vegetables such as carrots and sweet potatoes. § Dry beans (such as [place]owell and kidney beans) and peas (such as lentils). ? 2 cups of fresh, frozen, or canned fruit. A small apple or 1 banana or orange equals 1 cup. ? 3 cups of nonfat or low-fat milk, yogurt, or other milk products. ? 5½ ounces of meat and beans, such as chicken, fish, lean meat, beans, nuts, and seeds.  One egg, 1 tablespoon of peanut butter, ½ ounce nuts or seeds, or ¼ cup of cooked beans equals 1 ounce of meat. · Learn how to read food labels for serving sizes and ingredients. Fast-food and convenience-food meals often contain few or no fruits or vegetables. Make sure you eat some fruits and vegetables to make the meal more nutritious. · Look at your food diary. For each food group, add up what you have eaten and then divide the total by the number of days. This will give you an idea of how much you are eating from each food group. See if you can find some ways to change your diet to make it more healthy. Start small  · Do not try to make dramatic changes to your diet all at once. You might feel that you are missing out on your favorite foods and then be more likely to fail. · Start slowly, and gradually change your habits. Try some of the following:  ? Use whole wheat bread instead of white bread. ? Use nonfat or low-fat milk instead of whole milk. ? Eat brown rice instead of white rice, and eat whole wheat pasta instead of white-flour pasta. ? Try low-fat cheeses and low-fat yogurt. ? Add more fruits and vegetables to meals and have them for snacks. ? Add lettuce, tomato, cucumber, and onion to sandwiches. ? Add fruit to yogurt and cereal.  Enjoy food  · You can still eat your favorite foods. You just may need to eat less of them. If your favorite foods are high in fat, salt, and sugar, limit how often you eat them, but do not cut them out entirely. · Eat a wide variety of foods. Make healthy choices when eating out  · The type of restaurant you choose can help you make healthy choices. Even fast-food chains are now offering more low-fat or healthier choices on the menu. · Choose smaller portions, or take half of your meal home. · When eating out, try:  ? A veggie pizza with a whole wheat crust or grilled chicken (instead of sausage or pepperoni).   ? Pasta with roasted vegetables, grilled chicken, or marinara sauce instead of cream sauce. ? A vegetable wrap or grilled chicken wrap. ? Broiled or poached food instead of fried or breaded items. Make healthy choices easy  · Buy packaged, prewashed, ready-to-eat fresh vegetables and fruits, such as baby carrots, salad mixes, and chopped or shredded broccoli and cauliflower. · Buy packaged, presliced fruits, such as melon or pineapple. · Choose 100% fruit or vegetable juice instead of soda. Limit juice intake to 4 to 6 oz (½ to ¾ cup) a day. · Blend low-fat yogurt, fruit juice, and canned or frozen fruit to make a smoothie for breakfast or a snack. Where can you learn more? Go to http://www.bray.com/  Enter T756 in the search box to learn more about \"Eating Healthy Foods: Care Instructions. \"  Current as of: December 17, 2020               Content Version: 12.8  © 2006-2021 [employer]. Care instructions adapted under license by [employer] (which disclaims liability or warranty for this information). If you have questions about a medical condition or this instruction, always ask your healthcare professional. Tayo Hercules any warranty or liability for your use of this information.

## 2021-09-03 DIAGNOSIS — D64.89 ANEMIA DUE TO OTHER CAUSE, NOT CLASSIFIED: Primary | ICD-10-CM

## 2021-09-03 DIAGNOSIS — D75.839 THROMBOCYTOSIS: ICD-10-CM

## 2021-09-03 LAB
ALBUMIN SERPL-MCNC: 3.2 G/DL (ref 3.5–5)
ALBUMIN/GLOB SERPL: 0.8 {RATIO} (ref 1.1–2.2)
ALP SERPL-CCNC: 99 U/L (ref 45–117)
ALT SERPL-CCNC: 23 U/L (ref 12–78)
ANION GAP SERPL CALC-SCNC: 4 MMOL/L (ref 5–15)
AST SERPL-CCNC: 14 U/L (ref 15–37)
BILIRUB SERPL-MCNC: 0.3 MG/DL (ref 0.2–1)
BUN SERPL-MCNC: 12 MG/DL (ref 6–20)
BUN/CREAT SERPL: 18 (ref 12–20)
CALCIUM SERPL-MCNC: 9.1 MG/DL (ref 8.5–10.1)
CHLORIDE SERPL-SCNC: 107 MMOL/L (ref 97–108)
CHOLEST SERPL-MCNC: 139 MG/DL
CO2 SERPL-SCNC: 25 MMOL/L (ref 21–32)
CREAT SERPL-MCNC: 0.65 MG/DL (ref 0.55–1.02)
ERYTHROCYTE [DISTWIDTH] IN BLOOD BY AUTOMATED COUNT: 17.3 % (ref 11.5–14.5)
GLOBULIN SER CALC-MCNC: 4.1 G/DL (ref 2–4)
GLUCOSE SERPL-MCNC: 85 MG/DL (ref 65–100)
HCT VFR BLD AUTO: 35.6 % (ref 35–47)
HCV AB SERPL QL IA: NONREACTIVE
HDLC SERPL-MCNC: 57 MG/DL
HDLC SERPL: 2.4 {RATIO} (ref 0–5)
HGB BLD-MCNC: 11.1 G/DL (ref 11.5–16)
LDLC SERPL CALC-MCNC: 62 MG/DL (ref 0–100)
MCH RBC QN AUTO: 24.4 PG (ref 26–34)
MCHC RBC AUTO-ENTMCNC: 31.2 G/DL (ref 30–36.5)
MCV RBC AUTO: 78.4 FL (ref 80–99)
NRBC # BLD: 0 K/UL (ref 0–0.01)
NRBC BLD-RTO: 0 PER 100 WBC
PLATELET # BLD AUTO: 450 K/UL (ref 150–400)
PMV BLD AUTO: 10.2 FL (ref 8.9–12.9)
POTASSIUM SERPL-SCNC: 4.4 MMOL/L (ref 3.5–5.1)
PROT SERPL-MCNC: 7.3 G/DL (ref 6.4–8.2)
RBC # BLD AUTO: 4.54 M/UL (ref 3.8–5.2)
SODIUM SERPL-SCNC: 136 MMOL/L (ref 136–145)
TRIGL SERPL-MCNC: 100 MG/DL (ref ?–150)
VLDLC SERPL CALC-MCNC: 20 MG/DL
WBC # BLD AUTO: 9.3 K/UL (ref 3.6–11)

## 2021-09-03 RX ORDER — LANOLIN ALCOHOL/MO/W.PET/CERES
325 CREAM (GRAM) TOPICAL
Qty: 90 TABLET | Refills: 3 | Status: SHIPPED | OUTPATIENT
Start: 2021-09-03

## 2021-09-03 NOTE — PROGRESS NOTES
Slightly anemic, will send in iron daily. Also with some thrombocytosis, likely reactive - recheck next visit. All other labs okay. Called to discuss with patient. All questions answered.

## 2022-03-18 PROBLEM — O13.9 GESTATIONAL HYPERTENSION: Status: ACTIVE | Noted: 2018-10-24

## 2022-03-18 PROBLEM — Q44.79: Status: ACTIVE | Noted: 2021-09-02

## 2022-03-19 PROBLEM — L73.2 HIDRADENITIS SUPPURATIVA: Status: ACTIVE | Noted: 2021-09-02

## 2022-03-20 PROBLEM — E66.813 CLASS 3 SEVERE OBESITY DUE TO EXCESS CALORIES WITHOUT SERIOUS COMORBIDITY WITH BODY MASS INDEX (BMI) OF 40.0 TO 44.9 IN ADULT (HCC): Status: ACTIVE | Noted: 2021-09-02

## 2022-09-05 ENCOUNTER — HOSPITAL ENCOUNTER (EMERGENCY)
Age: 27
Discharge: HOME | End: 2022-09-05
Attending: EMERGENCY MEDICINE
Payer: COMMERCIAL

## 2022-09-05 ENCOUNTER — APPOINTMENT (OUTPATIENT)
Dept: ULTRASOUND IMAGING | Age: 27
End: 2022-09-05
Attending: EMERGENCY MEDICINE
Payer: COMMERCIAL

## 2022-09-05 VITALS
WEIGHT: 248 LBS | HEART RATE: 79 BPM | DIASTOLIC BLOOD PRESSURE: 99 MMHG | HEIGHT: 66 IN | TEMPERATURE: 98.2 F | OXYGEN SATURATION: 98 % | BODY MASS INDEX: 39.86 KG/M2 | SYSTOLIC BLOOD PRESSURE: 148 MMHG | RESPIRATION RATE: 16 BRPM

## 2022-09-05 DIAGNOSIS — O36.80X0 PREGNANCY OF UNKNOWN ANATOMIC LOCATION: ICD-10-CM

## 2022-09-05 DIAGNOSIS — O03.9 SPONTANEOUS MISCARRIAGE: Primary | ICD-10-CM

## 2022-09-05 LAB
ANION GAP SERPL CALC-SCNC: 10 MMOL/L (ref 5–15)
BASOPHILS # BLD: 0.1 K/UL (ref 0–0.1)
BASOPHILS NFR BLD: 2 % (ref 0–1)
BUN SERPL-MCNC: 9 MG/DL (ref 6–20)
BUN/CREAT SERPL: 16 (ref 12–20)
CALCIUM SERPL-MCNC: 9.7 MG/DL (ref 8.6–10)
CHLORIDE SERPL-SCNC: 103 MMOL/L (ref 98–107)
CO2 SERPL-SCNC: 25 MMOL/L (ref 22–29)
COMMENT, HOLDF: NORMAL
CREAT SERPL-MCNC: 0.56 MG/DL (ref 0.5–0.9)
DIFFERENTIAL METHOD BLD: ABNORMAL
EOSINOPHIL # BLD: 0.1 K/UL (ref 0–0.4)
EOSINOPHIL NFR BLD: 2 % (ref 0–7)
ERYTHROCYTE [DISTWIDTH] IN BLOOD BY AUTOMATED COUNT: 15.5 % (ref 11.5–14.5)
GLUCOSE SERPL-MCNC: 89 MG/DL (ref 65–100)
HCG SERPL-ACNC: 19 MIU/ML
HCG UR QL: NEGATIVE
HCG UR QL: POSITIVE
HCT VFR BLD AUTO: 38.2 % (ref 35–47)
HGB BLD-MCNC: 12 G/DL (ref 11.5–16)
IMM GRANULOCYTES # BLD AUTO: 0 K/UL (ref 0–0.04)
IMM GRANULOCYTES NFR BLD AUTO: 0 % (ref 0–0.5)
LYMPHOCYTES # BLD: 2.8 K/UL (ref 0.8–3.5)
LYMPHOCYTES NFR BLD: 40 % (ref 12–49)
MCH RBC QN AUTO: 25.6 PG (ref 26–34)
MCHC RBC AUTO-ENTMCNC: 31.4 G/DL (ref 30–36.5)
MCV RBC AUTO: 81.6 FL (ref 80–99)
MONOCYTES # BLD: 0.5 K/UL (ref 0–1)
MONOCYTES NFR BLD: 7 % (ref 5–13)
NEUTS SEG # BLD: 3.4 K/UL (ref 1.8–8)
NEUTS SEG NFR BLD: 49 % (ref 32–75)
NRBC # BLD: 0 K/UL (ref 0–0.01)
NRBC BLD-RTO: 0 PER 100 WBC
PLATELET # BLD AUTO: 410 K/UL (ref 150–400)
PMV BLD AUTO: 9.8 FL (ref 8.9–12.9)
POTASSIUM SERPL-SCNC: 4 MMOL/L (ref 3.5–5.1)
RBC # BLD AUTO: 4.68 M/UL (ref 3.8–5.2)
SAMPLES BEING HELD,HOLD: NORMAL
SODIUM SERPL-SCNC: 138 MMOL/L (ref 136–145)
WBC # BLD AUTO: 6.9 K/UL (ref 3.6–11)

## 2022-09-05 PROCEDURE — 76801 OB US < 14 WKS SINGLE FETUS: CPT

## 2022-09-05 PROCEDURE — 81025 URINE PREGNANCY TEST: CPT

## 2022-09-05 PROCEDURE — 84702 CHORIONIC GONADOTROPIN TEST: CPT

## 2022-09-05 PROCEDURE — 85025 COMPLETE CBC W/AUTO DIFF WBC: CPT

## 2022-09-05 PROCEDURE — 76817 TRANSVAGINAL US OBSTETRIC: CPT

## 2022-09-05 PROCEDURE — 36415 COLL VENOUS BLD VENIPUNCTURE: CPT

## 2022-09-05 PROCEDURE — 80048 BASIC METABOLIC PNL TOTAL CA: CPT

## 2022-09-05 PROCEDURE — 99284 EMERGENCY DEPT VISIT MOD MDM: CPT

## 2022-09-05 NOTE — ED TRIAGE NOTES
Pt arrives from home with c/o vaginal bleeding since this morning. Pt reports the bleeding being light with the bleeding decreasing since this morning when she woke up. Pt reports being 5 weeks pregnant. Pt denies any new onset of abdominal pain.

## 2022-09-07 NOTE — ED PROVIDER NOTES
The history is provided by the patient. Pregnancy Problem   This is a new problem. The current episode started 3 to 5 hours ago. The problem occurs constantly. The problem has not changed since onset. The pain is associated with an unknown factor. The patient is experiencing no pain. Pertinent negatives include no fever, no dysuria and no frequency. Associated symptoms comments: Vaginal bleeding started this morning similar to typical menstrual cycle at roughly 5 weeks of pregnancy by LMP. Sameer Yan Nothing worsens the pain. The pain is relieved by Nothing. Past workup includes no ultrasound.       Past Medical History:   Diagnosis Date    Anemia     Gestational hypertension     Gestational hypertension 10/24/2018    Vascular malformation of liver 9/2/2021       Past Surgical History:   Procedure Laterality Date    HX WISDOM TEETH EXTRACTION Bilateral          Family History:   Problem Relation Age of Onset    Hypertension Mother     Hypertension Maternal Grandmother     Ovarian Cancer Maternal Grandmother         and cervical    Diabetes Paternal Grandmother     Cancer Paternal Grandmother         breast    Hypertension Paternal Grandmother     Hypertension Father         preDM       Social History     Socioeconomic History    Marital status: SINGLE     Spouse name: Not on file    Number of children: Not on file    Years of education: Not on file    Highest education level: Not on file   Occupational History    Not on file   Tobacco Use    Smoking status: Never    Smokeless tobacco: Never   Substance and Sexual Activity    Alcohol use: Yes     Comment: occasionally     Drug use: No    Sexual activity: Yes     Partners: Male     Birth control/protection: None   Other Topics Concern     Service Not Asked    Blood Transfusions Not Asked    Caffeine Concern Not Asked    Occupational Exposure Not Asked    Hobby Hazards Not Asked    Sleep Concern Not Asked    Stress Concern Not Asked    Weight Concern Not Asked    Special Diet Not Asked    Back Care Not Asked    Exercise Not Asked    Bike Helmet Not Asked   [address] Not Asked    Self-Exams Not Asked   Social History Narrative    Not on file     Social Determinants of Health     Financial Resource Strain: Not on file   Food Insecurity: Not on file   Transportation Needs: Not on file   Physical Activity: Not on file   Stress: Not on file   Social Connections: Not on file   Intimate Partner Violence: Not on file   Housing Stability: Not on file         ALLERGIES: Food [kiwi]    Review of Systems   Constitutional:  Negative for fever. Genitourinary:  Negative for dysuria and frequency. All other systems reviewed and are negative. Vitals:    09/05/22 0810   BP: (!) 148/99   Pulse: 79   Resp: 16   Temp: 98.2 °F (36.8 °C)   SpO2: 98%   Weight: 112.5 kg (248 lb)   Height: 5' 6\" (1.676 m)          Physical Exam  Vitals and nursing note reviewed. Constitutional:       General: She is not in acute distress. Appearance: She is well-developed. HENT:      Head: Normocephalic and atraumatic. Eyes:      Conjunctiva/sclera: Conjunctivae normal.   Cardiovascular:      Rate and Rhythm: Normal rate and regular rhythm. Pulmonary:      Effort: Pulmonary effort is normal. No respiratory distress. Abdominal:      General: There is no distension. Palpations: Abdomen is soft. Tenderness: There is no abdominal tenderness. There is no guarding. Musculoskeletal:         General: No deformity. Normal range of motion. Cervical back: Neck supple. Skin:     General: Skin is warm and dry. Neurological:      Mental Status: She is alert. Cranial Nerves: No cranial nerve deficit. Psychiatric:         Behavior: Behavior normal.        MDM       26-year-old female presents with vaginal bleeding at 5 weeks of pregnancy starting this morning.   Differential diagnosis considerations include normal early intrauterine pregnancy, threatened miscarriage, or ectopic pregnancy. Initially I was told that the patient's urine pregnancy test was negative so she was stable for discharge as ectopic would have been ruled out. After the patient left the nurses informed me that the pregnancy test was positive and was incorrectly interpreted. We were able to call the patient and have her return to complete the work-up for possible ectopic. I apologized for the inconvenience that she encountered due to this error. No intrauterine pregnancy was identified, thus starting hCG level was 19 which is very low even at this early stage of pregnancy suggesting that she is likely experiencing a spontaneous miscarriage. Ectopic pregnancy cannot be completely ruled out so we discussed return precautions for worsening bleeding, pain or other concerning symptoms.   She is Rh+ from previous lab draw so will not require RhoGAM.  Discussed follow-up for 48-hour quant level to be sure she is downtrending appropriately especially if her symptoms persist.    Procedures

## 2025-08-09 ENCOUNTER — HOSPITAL ENCOUNTER (EMERGENCY)
Facility: HOSPITAL | Age: 30
Discharge: HOME | End: 2025-08-09
Attending: STUDENT IN AN ORGANIZED HEALTH CARE EDUCATION/TRAINING PROGRAM
Payer: COMMERCIAL

## 2025-08-09 VITALS
BODY MASS INDEX: 37.12 KG/M2 | HEIGHT: 66 IN | TEMPERATURE: 98.4 F | OXYGEN SATURATION: 100 % | SYSTOLIC BLOOD PRESSURE: 123 MMHG | WEIGHT: 231 LBS | DIASTOLIC BLOOD PRESSURE: 68 MMHG | RESPIRATION RATE: 16 BRPM | HEART RATE: 72 BPM

## 2025-08-09 DIAGNOSIS — R11.2 NAUSEA AND VOMITING, UNSPECIFIED VOMITING TYPE: Primary | ICD-10-CM

## 2025-08-09 DIAGNOSIS — T88.7XXA MEDICATION SIDE EFFECT: ICD-10-CM

## 2025-08-09 LAB
ALBUMIN SERPL-MCNC: 4.1 G/DL (ref 3.5–5.2)
ALBUMIN/GLOB SERPL: 1 (ref 1.1–2.2)
ALP SERPL-CCNC: 112 U/L (ref 35–104)
ALT SERPL-CCNC: <5 U/L (ref 10–35)
ANION GAP SERPL CALC-SCNC: 12 MMOL/L (ref 2–12)
AST SERPL-CCNC: 13 U/L (ref 10–35)
BASOPHILS # BLD: 0.01 K/UL (ref 0–0.1)
BASOPHILS NFR BLD: 0.1 % (ref 0–1)
BILIRUB SERPL-MCNC: 0.3 MG/DL (ref 0.2–1)
BUN SERPL-MCNC: 11 MG/DL (ref 6–20)
BUN/CREAT SERPL: 18 (ref 12–20)
CALCIUM SERPL-MCNC: 9.4 MG/DL (ref 8.6–10)
CHLORIDE SERPL-SCNC: 102 MMOL/L (ref 98–107)
CO2 SERPL-SCNC: 24 MMOL/L (ref 22–29)
CREAT SERPL-MCNC: 0.62 MG/DL (ref 0.5–0.9)
DIFFERENTIAL METHOD BLD: ABNORMAL
EOSINOPHIL # BLD: 0.11 K/UL (ref 0–0.4)
EOSINOPHIL NFR BLD: 1.3 % (ref 0–7)
ERYTHROCYTE [DISTWIDTH] IN BLOOD BY AUTOMATED COUNT: 20.8 % (ref 11.5–14.5)
GLOBULIN SER CALC-MCNC: 4.1 G/DL (ref 2–4)
GLUCOSE SERPL-MCNC: 91 MG/DL (ref 65–100)
HCT VFR BLD AUTO: 31.4 % (ref 35–47)
HGB BLD-MCNC: 9.5 G/DL (ref 11.5–16)
IMM GRANULOCYTES # BLD AUTO: 0.02 K/UL (ref 0–0.04)
IMM GRANULOCYTES NFR BLD AUTO: 0.2 % (ref 0–0.5)
LYMPHOCYTES # BLD: 2.2 K/UL (ref 0.8–3.5)
LYMPHOCYTES NFR BLD: 25.9 % (ref 12–49)
MAGNESIUM SERPL-MCNC: 2.2 MG/DL (ref 1.6–2.6)
MCH RBC QN AUTO: 20.6 PG (ref 26–34)
MCHC RBC AUTO-ENTMCNC: 30.3 G/DL (ref 30–36.5)
MCV RBC AUTO: 68.1 FL (ref 80–99)
MONOCYTES # BLD: 0.63 K/UL (ref 0–1)
MONOCYTES NFR BLD: 7.4 % (ref 5–13)
NEUTS SEG # BLD: 5.53 K/UL (ref 1.8–8)
NEUTS SEG NFR BLD: 65.1 % (ref 32–75)
NRBC # BLD: 0 K/UL (ref 0–0.01)
NRBC BLD-RTO: 0 PER 100 WBC
PLATELET # BLD AUTO: 516 K/UL (ref 150–400)
PMV BLD AUTO: 9.5 FL (ref 8.9–12.9)
POTASSIUM SERPL-SCNC: 4.3 MMOL/L (ref 3.5–5.1)
PROT SERPL-MCNC: 8.2 G/DL (ref 6.4–8.3)
RBC # BLD AUTO: 4.61 M/UL (ref 3.8–5.2)
RBC MORPH BLD: ABNORMAL
SODIUM SERPL-SCNC: 138 MMOL/L (ref 136–145)
WBC # BLD AUTO: 8.5 K/UL (ref 3.6–11)

## 2025-08-09 PROCEDURE — 85025 COMPLETE CBC W/AUTO DIFF WBC: CPT

## 2025-08-09 PROCEDURE — 6370000000 HC RX 637 (ALT 250 FOR IP): Performed by: STUDENT IN AN ORGANIZED HEALTH CARE EDUCATION/TRAINING PROGRAM

## 2025-08-09 PROCEDURE — 36415 COLL VENOUS BLD VENIPUNCTURE: CPT

## 2025-08-09 PROCEDURE — 2580000003 HC RX 258: Performed by: STUDENT IN AN ORGANIZED HEALTH CARE EDUCATION/TRAINING PROGRAM

## 2025-08-09 PROCEDURE — 83735 ASSAY OF MAGNESIUM: CPT

## 2025-08-09 PROCEDURE — 80053 COMPREHEN METABOLIC PANEL: CPT

## 2025-08-09 PROCEDURE — 99284 EMERGENCY DEPT VISIT MOD MDM: CPT

## 2025-08-09 PROCEDURE — 6360000002 HC RX W HCPCS: Performed by: STUDENT IN AN ORGANIZED HEALTH CARE EDUCATION/TRAINING PROGRAM

## 2025-08-09 PROCEDURE — 96374 THER/PROPH/DIAG INJ IV PUSH: CPT

## 2025-08-09 RX ORDER — ONDANSETRON 4 MG/1
4 TABLET, ORALLY DISINTEGRATING ORAL ONCE
Status: COMPLETED | OUTPATIENT
Start: 2025-08-09 | End: 2025-08-09

## 2025-08-09 RX ORDER — ONDANSETRON 4 MG/1
4 TABLET, ORALLY DISINTEGRATING ORAL 3 TIMES DAILY PRN
Qty: 21 TABLET | Refills: 0 | Status: SHIPPED | OUTPATIENT
Start: 2025-08-09

## 2025-08-09 RX ORDER — ONDANSETRON 2 MG/ML
4 INJECTION INTRAMUSCULAR; INTRAVENOUS ONCE
Status: COMPLETED | OUTPATIENT
Start: 2025-08-09 | End: 2025-08-09

## 2025-08-09 RX ORDER — 0.9 % SODIUM CHLORIDE 0.9 %
1000 INTRAVENOUS SOLUTION INTRAVENOUS ONCE
Status: COMPLETED | OUTPATIENT
Start: 2025-08-09 | End: 2025-08-09

## 2025-08-09 RX ADMIN — ONDANSETRON 4 MG: 2 INJECTION, SOLUTION INTRAMUSCULAR; INTRAVENOUS at 08:35

## 2025-08-09 RX ADMIN — ONDANSETRON 4 MG: 4 TABLET, ORALLY DISINTEGRATING ORAL at 09:46

## 2025-08-09 RX ADMIN — SODIUM CHLORIDE 1000 ML: 0.9 INJECTION, SOLUTION INTRAVENOUS at 08:39

## 2025-08-09 ASSESSMENT — PAIN SCALES - GENERAL: PAINLEVEL_OUTOF10: 0

## 2025-08-09 ASSESSMENT — LIFESTYLE VARIABLES
HOW MANY STANDARD DRINKS CONTAINING ALCOHOL DO YOU HAVE ON A TYPICAL DAY: PATIENT DOES NOT DRINK
HOW OFTEN DO YOU HAVE A DRINK CONTAINING ALCOHOL: NEVER

## 2025-08-09 ASSESSMENT — ENCOUNTER SYMPTOMS: SHORTNESS OF BREATH: 0
